# Patient Record
Sex: FEMALE | Race: BLACK OR AFRICAN AMERICAN | Employment: OTHER | URBAN - METROPOLITAN AREA
[De-identification: names, ages, dates, MRNs, and addresses within clinical notes are randomized per-mention and may not be internally consistent; named-entity substitution may affect disease eponyms.]

---

## 2017-04-11 ENCOUNTER — OP HISTORICAL/CONVERTED ENCOUNTER (OUTPATIENT)
Dept: OTHER | Age: 67
End: 2017-04-11

## 2017-05-02 ENCOUNTER — OP HISTORICAL/CONVERTED ENCOUNTER (OUTPATIENT)
Dept: OTHER | Age: 67
End: 2017-05-02

## 2017-07-06 ENCOUNTER — OP HISTORICAL/CONVERTED ENCOUNTER (OUTPATIENT)
Dept: OTHER | Age: 67
End: 2017-07-06

## 2018-01-03 ENCOUNTER — OP HISTORICAL/CONVERTED ENCOUNTER (OUTPATIENT)
Dept: OTHER | Age: 68
End: 2018-01-03

## 2018-01-31 ENCOUNTER — OP HISTORICAL/CONVERTED ENCOUNTER (OUTPATIENT)
Dept: OTHER | Age: 68
End: 2018-01-31

## 2018-04-24 ENCOUNTER — OP HISTORICAL/CONVERTED ENCOUNTER (OUTPATIENT)
Dept: OTHER | Age: 68
End: 2018-04-24

## 2018-11-27 ENCOUNTER — OP HISTORICAL/CONVERTED ENCOUNTER (OUTPATIENT)
Dept: OTHER | Age: 68
End: 2018-11-27

## 2018-12-27 ENCOUNTER — OP HISTORICAL/CONVERTED ENCOUNTER (OUTPATIENT)
Dept: OTHER | Age: 68
End: 2018-12-27

## 2019-05-06 ENCOUNTER — ED HISTORICAL/CONVERTED ENCOUNTER (OUTPATIENT)
Dept: OTHER | Age: 69
End: 2019-05-06

## 2019-05-29 ENCOUNTER — OP HISTORICAL/CONVERTED ENCOUNTER (OUTPATIENT)
Dept: OTHER | Age: 69
End: 2019-05-29

## 2020-08-09 RX ORDER — HYDRALAZINE HYDROCHLORIDE 50 MG/1
TABLET, FILM COATED ORAL
Qty: 270 TAB | Refills: 1 | Status: SHIPPED | OUTPATIENT
Start: 2020-08-09 | End: 2022-10-25 | Stop reason: SDUPTHER

## 2020-08-20 RX ORDER — CLONIDINE 0.2 MG/24H
PATCH, EXTENDED RELEASE TRANSDERMAL
Qty: 12 PATCH | Refills: 1 | Status: SHIPPED | OUTPATIENT
Start: 2020-08-20 | End: 2021-02-05

## 2020-09-01 RX ORDER — DULOXETINE 40 MG/1
CAPSULE, DELAYED RELEASE ORAL
Qty: 90 CAP | Refills: 1 | Status: SHIPPED | OUTPATIENT
Start: 2020-09-01

## 2020-09-23 PROBLEM — J45.909 ASTHMATIC BRONCHITIS: Status: ACTIVE | Noted: 2020-09-23

## 2020-09-23 PROBLEM — G47.00 INSOMNIA: Status: ACTIVE | Noted: 2020-09-23

## 2020-09-23 PROBLEM — H81.10 BENIGN PAROXYSMAL POSITIONAL VERTIGO: Status: ACTIVE | Noted: 2020-09-23

## 2020-09-23 PROBLEM — L25.9 CONTACT DERMATITIS: Status: ACTIVE | Noted: 2020-09-23

## 2020-09-23 PROBLEM — K58.9 IRRITABLE BOWEL SYNDROME: Status: ACTIVE | Noted: 2020-09-23

## 2020-09-23 PROBLEM — I10 ESSENTIAL HYPERTENSION: Status: ACTIVE | Noted: 2020-09-23

## 2020-09-23 PROBLEM — R79.89 AZOTEMIA: Status: ACTIVE | Noted: 2020-09-23

## 2020-09-23 PROBLEM — M79.7 FIBROMYOSITIS: Status: ACTIVE | Noted: 2020-09-23

## 2020-09-23 PROBLEM — M54.50 LOW BACK PAIN: Status: ACTIVE | Noted: 2020-09-23

## 2020-09-23 PROBLEM — F17.200 TOBACCO DEPENDENCE SYNDROME: Status: ACTIVE | Noted: 2020-09-23

## 2020-09-23 PROBLEM — F32.9 REACTIVE DEPRESSION: Status: ACTIVE | Noted: 2020-09-23

## 2020-09-23 PROBLEM — J06.9 URI (UPPER RESPIRATORY INFECTION): Status: ACTIVE | Noted: 2020-09-23

## 2020-09-23 PROBLEM — G60.9 IDIOPATHIC PERIPHERAL NEUROPATHY: Status: ACTIVE | Noted: 2020-09-23

## 2020-09-23 PROBLEM — E78.5 HYPERLIPIDEMIA: Status: ACTIVE | Noted: 2020-09-23

## 2020-09-23 PROBLEM — I71.40 ABDOMINAL AORTIC ANEURYSM (AAA): Status: ACTIVE | Noted: 2020-09-23

## 2020-09-23 PROBLEM — M79.7 FIBROMYALGIA: Status: ACTIVE | Noted: 2020-09-23

## 2020-09-23 PROBLEM — M15.9 DEGENERATIVE JOINT DISEASE INVOLVING MULTIPLE JOINTS: Status: ACTIVE | Noted: 2020-09-23

## 2020-09-23 PROBLEM — R60.9 EDEMA: Status: ACTIVE | Noted: 2020-09-23

## 2020-09-24 ENCOUNTER — OFFICE VISIT (OUTPATIENT)
Dept: INTERNAL MEDICINE CLINIC | Age: 70
End: 2020-09-24
Payer: MEDICARE

## 2020-09-24 VITALS
BODY MASS INDEX: 33.2 KG/M2 | DIASTOLIC BLOOD PRESSURE: 72 MMHG | WEIGHT: 180.4 LBS | TEMPERATURE: 97.8 F | HEIGHT: 62 IN | SYSTOLIC BLOOD PRESSURE: 136 MMHG | RESPIRATION RATE: 18 BRPM

## 2020-09-24 DIAGNOSIS — I10 ESSENTIAL HYPERTENSION: ICD-10-CM

## 2020-09-24 DIAGNOSIS — R60.0 LEG EDEMA, LEFT: ICD-10-CM

## 2020-09-24 DIAGNOSIS — M79.7 FIBROMYOSITIS: ICD-10-CM

## 2020-09-24 DIAGNOSIS — R60.0 EDEMA OF RIGHT LOWER LEG: ICD-10-CM

## 2020-09-24 DIAGNOSIS — J06.9 UPPER RESPIRATORY TRACT INFECTION, UNSPECIFIED TYPE: ICD-10-CM

## 2020-09-24 DIAGNOSIS — I71.40 ABDOMINAL AORTIC ANEURYSM (AAA) WITHOUT RUPTURE: Primary | ICD-10-CM

## 2020-09-24 PROCEDURE — G8417 CALC BMI ABV UP PARAM F/U: HCPCS | Performed by: INTERNAL MEDICINE

## 2020-09-24 PROCEDURE — G8400 PT W/DXA NO RESULTS DOC: HCPCS | Performed by: INTERNAL MEDICINE

## 2020-09-24 PROCEDURE — 1090F PRES/ABSN URINE INCON ASSESS: CPT | Performed by: INTERNAL MEDICINE

## 2020-09-24 PROCEDURE — G8427 DOCREV CUR MEDS BY ELIG CLIN: HCPCS | Performed by: INTERNAL MEDICINE

## 2020-09-24 PROCEDURE — G8536 NO DOC ELDER MAL SCRN: HCPCS | Performed by: INTERNAL MEDICINE

## 2020-09-24 PROCEDURE — 99214 OFFICE O/P EST MOD 30 MIN: CPT | Performed by: INTERNAL MEDICINE

## 2020-09-24 PROCEDURE — 3017F COLORECTAL CA SCREEN DOC REV: CPT | Performed by: INTERNAL MEDICINE

## 2020-09-24 PROCEDURE — G9717 DOC PT DX DEP/BP F/U NT REQ: HCPCS | Performed by: INTERNAL MEDICINE

## 2020-09-24 PROCEDURE — 1101F PT FALLS ASSESS-DOCD LE1/YR: CPT | Performed by: INTERNAL MEDICINE

## 2020-09-24 RX ORDER — AZITHROMYCIN 250 MG/1
250 TABLET, FILM COATED ORAL SEE ADMIN INSTRUCTIONS
Qty: 6 TAB | Refills: 0 | Status: SHIPPED | OUTPATIENT
Start: 2020-09-24 | End: 2020-09-29

## 2020-09-24 RX ORDER — ALBUTEROL SULFATE 90 UG/1
1 AEROSOL, METERED RESPIRATORY (INHALATION)
Qty: 1 INHALER | Refills: 4 | Status: SHIPPED | OUTPATIENT
Start: 2020-09-24 | End: 2020-10-24

## 2020-09-24 RX ORDER — DEXTROMETHORPHAN HYDROBROMIDE, GUAIFENESIN 20; 400 MG/20ML; MG/20ML
5 SOLUTION ORAL
Qty: 120 ML | Refills: 0 | Status: SHIPPED | OUTPATIENT
Start: 2020-09-24

## 2020-09-24 NOTE — PROGRESS NOTES
Rohit Lawson is a 77 y.o. female and presents with Follow Up Chronic Condition and Hypertension  Patient presents accompanied by  she is followed by Dr. Cholo Cooley vascular surgeon for aortic aneurysm of the abdomen. She has no change in symptoms of back pain from her spinal stenosis and arthritis and fibromyalgia. Aneurysm is approaching size 5 cm in diameter and she will have another ultrasound next month and follow-up with Dr. Cholo Cooley after that I advised the patient that I feel she needs close creatinine now and surgical intervention and she will follow-up. She is on carvedilol which might help she will pressure reduction in blood pressure along with clonidine patch 0.2 mg a week furosemide 20 mg a day cardia.   Blood pressure monitored by Dr. Chey Tobin nephrologist for her mild renal impairment hydralazine was lowered to 50 mg 1/2 tablet twice a day by Dr. Chey Tobin she will follow-up with the nephrologist she obtains laboratory studies through the nephrologist and the rheumatologist she continues on nifedipine 60 mg a day she needed a refill of pro-air for her asthmatic COPD and cough which may be more related to postnasal drip from seasonal allergies or recent upper infection she says she wanted a refill of the Z-Alberto I feel the cough is postnasal drip she was fatigued from the gabapentin 100 mg twice a day by the rheumatologist and does not sleep well at night I advised her to switch until the time she can see her rheumatologist to follow-up the gabapentin to 200 mg at night instead of 100 mg twice a day she wants to receive the seasonal flu vaccine here when we get in 2 weeks she will try to lose weight diet exercise she ambulates with a cane she takes Tylenol 3 for pain by Dr. Tameka Freire rheumatologist we reviewed last labs sed rate 59 June of this year TSH 2.5 cholesterol high at 285 but will try to avoid statin drugs due to her chronic muscle joint pain she will try to lose weight take low-cholesterol diet creatinine 1.37 bilirubin 0.2 glucose 75 she was advised to discontinue duloxetine 40 mg a day as she says it does not help the pain she is trying to cut down on meds that do not seem to help and she will discuss this with  could talk us as well she is agreed with for noninvasive venous studies of the legs to further evaluate her chronic edema although may be more related to her medication she has negative Homans sign but history of elevated d-dimer           Review of Systems  Constitutional: negative for fevers, chills, anorexia, weight loss and fatigue,no insomnia  Eyes:   negative for visual disturbance and irritation, eye discharge, eye pain. no eye redness. ENT:   negative for tinnitus, sore throat, nasal congestion, ear pain, hoarseness, hearing loss.,no snoring. Respiratory:  negative for cough, hemoptysis, shortness of breath, wheezing,  CV:   negative for chest pain, palpitations, lower extremity edema, shortness of breath while sitting, walking or at night  GI:   negative for nausea, vomiting, diarrhea, abdominal pain,melena,constipation. Endo:               negative for polyuria, polydipsia, polyphagia, cold or heat intolerance,hair loss. Genitourinary: negative for frequency, dysuria and hematuria,urethral discharge,nocturia.straining while urination,urinary incontinence. Integument:  negative for rash and pruritus  Hematologic:  negative for easy bruising and gum/nose bleeding, enlarged nodes  Musculoskel: negative for myalgias, noted arthralgias, back pain, muscle weakness,noted  joint pain, h/o fall,cramps,calf pain. Neurological:  negative for headaches, dizziness, vertigo, memory problems, gait and seizures loss of consciousness,no ataxia.   Behavl/Psych: negative for feelings of anxiety, depression, mood changes ,sadness    Past Medical History:   Diagnosis Date    Abdominal aortic aneurysm (AAA) ([facility] [address] 9/23/2020    Asthmatic bronchitis 9/23/2020    Azotemia 9/23/2020    Benign paroxysmal positional vertigo 9/23/2020    Contact dermatitis 9/23/2020    Degenerative joint disease involving multiple joints 9/23/2020    Edema 9/23/2020    Essential hypertension 9/23/2020    Fibromyalgia 9/23/2020    Fibromyositis 9/23/2020    Hyperlipidemia 9/23/2020    Idiopathic peripheral neuropathy 9/23/2020    Insomnia 9/23/2020    Irritable bowel syndrome 9/23/2020    Low back pain 9/23/2020    Reactive depression 9/23/2020    Tobacco dependence syndrome 9/23/2020    URI (upper respiratory infection) 9/23/2020     Past Surgical History:   Procedure Laterality Date    HX CHOLECYSTECTOMY  1996    HX HYSTERECTOMY  1995    HX KNEE REPLACEMENT  1980    HX ORTHOPAEDIC Bilateral 2000    carpal tunnel release    HX ORTHOPAEDIC  1996    Right thumb surgery    HX PARATHYROIDECTOMY  1985     Social History     Socioeconomic History    Marital status:      Spouse name: Not on file    Number of children: Not on file    Years of education: Not on file    Highest education level: Not on file   Tobacco Use    Smoking status: Current Every Day Smoker     Packs/day: 0.50     Years: 18.00     Pack years: 9.00     Types: Cigarettes    Smokeless tobacco: Never Used     Family History   Problem Relation Age of Onset    Hypertension Mother     Stroke Mother     Heart Disease Mother     Hypertension Father     Stroke Father      Current Outpatient Medications   Medication Sig Dispense Refill    azithromycin (ZITHROMAX) 250 mg tablet Take 1 Tab by mouth See Admin Instructions for 5 days. 6 Tab 0    dextromethorphan-guaiFENesin (Robitussin Cough-Chest Sunday DM) 5-100 mg/5 mL liqd Take 5 mL by mouth four (4) times daily as needed for Cough or Congestion. 120 mL 0    albuterol (ProAir HFA) 90 mcg/actuation inhaler Take 1 Puff by inhalation every four (4) hours as needed for Wheezing or Shortness of Breath for up to 30 days.  1 Inhaler 4    DULoxetine 40 mg cpDR take 1 capsule by mouth once daily 90 Cap 1    cloNIDine (CATAPRES) 0.2 mg/24 hr ptwk apply 1 patch every week as directed 12 Patch 1    hydrALAZINE (APRESOLINE) 50 mg tablet take 1 tablet by mouth three times a day 270 Tab 1     Allergies   Allergen Reactions    Dexamethasone Other (comments)     Edema         Objective:  Visit Vitals  /72 (BP 1 Location: Right arm, BP Patient Position: Sitting)   Temp 97.8 °F (36.6 °C) (Oral)   Resp 18   Ht 5' 2\" (1.575 m)   Wt 180 lb 6.4 oz (81.8 kg)   BMI 33.00 kg/m²       Physical Exam:   Constitutional: General Appearance: healthy-appearing and obese. Level of Distress: NAD. Ambulation: ambulating with cane   Psychiatric: Mental Status: normal mood and affect and active and alert. Orientation: to time, place, and person. no agitation. ,normal eye contact. normal insight  Head: Head: normocephalic and atraumatic. Eyes: Pupils: PERRLA. Sclerae: non-icteric. ENMT: No lesions on external ear, no hearing loss. No lesions on external nose, sinus tenderness, or nasal discharge. Lips, Teeth, and no mouth or lip ulcers   Neck: Neck: supple, trachea midline, and no masses. Lymph Nodes: no cervical LAD. Thyroid: no enlargement or nodules and non-tender. Lungs: Respiratory effort: no dyspnea. Auscultation: no wheezing, rales/crackles, occasional rhonchi rhonchi and breath sounds normal and good air movement. Cardiovascular: Apical Impulse: not displaced. Heart Auscultation: normal S1 and S2; no murmurs, rubs, or gallops; and RRR. Neck vessels: no carotid bruits. Pulses including femoral / pedal: normal throughout. Abdomen: Bowel Sounds: normal. Inspection and Palpation: no tenderness, guarding, or masses and soft and non-distended. Liver: non-tender and no hepatomegaly. Spleen: non-tender and no splenomegaly  Musculoskeletal[de-identified] Extremities: no edema or varicosities. Calf tenderness. djd  Neurologic: Gait and Station: normal gait and station. Motor Strength normal right and left.  Sensory and cerebellar intact. Skin: Inspection and palpation: no rash, lesions, or ulcer. No results found for this or any previous visit. Assessment/Plan:    ICD-10-CM ICD-9-CM    1. Abdominal aortic aneurysm (AAA) without rupture (HCC)  I71.4 441.4    2. Essential hypertension  I10 401.9    3. Fibromyositis  M79.7 729.1    4. Leg edema, left  R60.0 782.3 US EXT NONVAS RT LTD      US EXT NONVAS LT LTD   5. Edema of right lower leg  R60.0 782.3 US EXT NONVAS RT LTD   6. Upper respiratory tract infection, unspecified type  J06.9 465.9      Orders Placed This Encounter    US EXT NONVAS RT LTD     Standing Status:   Future     Standing Expiration Date:   10/24/2021     Order Specific Question:   Specific Body Part     Answer:   leg     Order Specific Question:   Reason for Exam     Answer:   edema    US EXT NONVAS RT LTD     Standing Status:   Future     Standing Expiration Date:   10/24/2021     Order Specific Question:   Specific Body Part     Answer:   leg    US EXT NONVAS LT LTD     Standing Status:   Future     Standing Expiration Date:   10/24/2021     Order Specific Question:   Specific Body Part     Answer:   leg    azithromycin (ZITHROMAX) 250 mg tablet     Sig: Take 1 Tab by mouth See Admin Instructions for 5 days. Dispense:  6 Tab     Refill:  0    dextromethorphan-guaiFENesin (Robitussin Cough-Chest Sunday DM) 5-100 mg/5 mL liqd     Sig: Take 5 mL by mouth four (4) times daily as needed for Cough or Congestion. Dispense:  120 mL     Refill:  0    albuterol (ProAir HFA) 90 mcg/actuation inhaler     Sig: Take 1 Puff by inhalation every four (4) hours as needed for Wheezing or Shortness of Breath for up to 30 days. Dispense:  1 Inhaler     Refill:  4       lose weight, follow low fat diet, continue present plan, call if any problems    There are no Patient Instructions on file for this visit. Follow-up and Dispositions    · Return in about 6 months (around 3/24/2021).

## 2020-10-05 ENCOUNTER — OFFICE VISIT (OUTPATIENT)
Dept: INTERNAL MEDICINE CLINIC | Age: 70
End: 2020-10-05
Payer: MEDICARE

## 2020-10-05 DIAGNOSIS — Z23 NEEDS FLU SHOT: Primary | ICD-10-CM

## 2020-10-05 DIAGNOSIS — R60.0 BILATERAL LEG EDEMA: Primary | ICD-10-CM

## 2020-10-05 PROCEDURE — 90756 CCIIV4 VACC ABX FREE IM: CPT | Performed by: INTERNAL MEDICINE

## 2020-11-02 ENCOUNTER — HOSPITAL ENCOUNTER (OUTPATIENT)
Dept: NON INVASIVE DIAGNOSTICS | Age: 70
Discharge: HOME | End: 2020-11-02
Attending: INTERNAL MEDICINE
Payer: MEDICARE

## 2020-11-02 DIAGNOSIS — R60.0 BILATERAL LEG EDEMA: ICD-10-CM

## 2020-11-02 PROCEDURE — 93970 EXTREMITY STUDY: CPT

## 2021-02-05 RX ORDER — CLONIDINE 0.2 MG/24H
PATCH, EXTENDED RELEASE TRANSDERMAL
Qty: 12 PATCH | Refills: 1 | Status: SHIPPED | OUTPATIENT
Start: 2021-02-05 | End: 2021-08-27

## 2021-02-16 ENCOUNTER — IMMUNIZATION (OUTPATIENT)
Dept: FAMILY MEDICINE CLINIC | Age: 71
End: 2021-02-16
Payer: MEDICARE

## 2021-02-16 DIAGNOSIS — Z23 ENCOUNTER FOR IMMUNIZATION: Primary | ICD-10-CM

## 2021-02-16 PROCEDURE — 91301 COVID-19, MRNA, LNP-S, PF, 100MCG/0.5ML DOSE(MODERNA): CPT | Performed by: FAMILY MEDICINE

## 2021-02-16 PROCEDURE — 0011A COVID-19, MRNA, LNP-S, PF, 100MCG/0.5ML DOSE(MODERNA): CPT | Performed by: FAMILY MEDICINE

## 2021-03-16 ENCOUNTER — IMMUNIZATION (OUTPATIENT)
Dept: FAMILY MEDICINE CLINIC | Age: 71
End: 2021-03-16
Payer: MEDICARE

## 2021-03-16 DIAGNOSIS — Z23 ENCOUNTER FOR IMMUNIZATION: Primary | ICD-10-CM

## 2021-03-16 PROCEDURE — 0012A COVID-19, MRNA, LNP-S, PF, 100MCG/0.5ML DOSE(MODERNA): CPT | Performed by: FAMILY MEDICINE

## 2021-03-16 PROCEDURE — 91301 COVID-19, MRNA, LNP-S, PF, 100MCG/0.5ML DOSE(MODERNA): CPT | Performed by: FAMILY MEDICINE

## 2021-03-25 ENCOUNTER — OFFICE VISIT (OUTPATIENT)
Dept: INTERNAL MEDICINE CLINIC | Age: 71
End: 2021-03-25
Payer: MEDICARE

## 2021-03-25 VITALS
BODY MASS INDEX: 27.9 KG/M2 | DIASTOLIC BLOOD PRESSURE: 70 MMHG | SYSTOLIC BLOOD PRESSURE: 110 MMHG | HEART RATE: 68 BPM | TEMPERATURE: 98.1 F | WEIGHT: 151.6 LBS | RESPIRATION RATE: 12 BRPM | OXYGEN SATURATION: 100 % | HEIGHT: 62 IN

## 2021-03-25 DIAGNOSIS — R51.9 CHRONIC NONINTRACTABLE HEADACHE, UNSPECIFIED HEADACHE TYPE: ICD-10-CM

## 2021-03-25 DIAGNOSIS — I71.40 ABDOMINAL AORTIC ANEURYSM (AAA) WITHOUT RUPTURE: Primary | ICD-10-CM

## 2021-03-25 DIAGNOSIS — M79.7 FIBROMYOSITIS: ICD-10-CM

## 2021-03-25 DIAGNOSIS — K58.8 OTHER IRRITABLE BOWEL SYNDROME: ICD-10-CM

## 2021-03-25 DIAGNOSIS — G60.9 IDIOPATHIC PERIPHERAL NEUROPATHY: ICD-10-CM

## 2021-03-25 DIAGNOSIS — E78.00 PURE HYPERCHOLESTEROLEMIA: ICD-10-CM

## 2021-03-25 DIAGNOSIS — G89.29 CHRONIC NONINTRACTABLE HEADACHE, UNSPECIFIED HEADACHE TYPE: ICD-10-CM

## 2021-03-25 DIAGNOSIS — F32.9 REACTIVE DEPRESSION: ICD-10-CM

## 2021-03-25 DIAGNOSIS — F51.01 PRIMARY INSOMNIA: ICD-10-CM

## 2021-03-25 DIAGNOSIS — I10 ESSENTIAL HYPERTENSION: ICD-10-CM

## 2021-03-25 PROCEDURE — G8417 CALC BMI ABV UP PARAM F/U: HCPCS | Performed by: INTERNAL MEDICINE

## 2021-03-25 PROCEDURE — 1101F PT FALLS ASSESS-DOCD LE1/YR: CPT | Performed by: INTERNAL MEDICINE

## 2021-03-25 PROCEDURE — G9717 DOC PT DX DEP/BP F/U NT REQ: HCPCS | Performed by: INTERNAL MEDICINE

## 2021-03-25 PROCEDURE — 3017F COLORECTAL CA SCREEN DOC REV: CPT | Performed by: INTERNAL MEDICINE

## 2021-03-25 PROCEDURE — 99214 OFFICE O/P EST MOD 30 MIN: CPT | Performed by: INTERNAL MEDICINE

## 2021-03-25 PROCEDURE — G8427 DOCREV CUR MEDS BY ELIG CLIN: HCPCS | Performed by: INTERNAL MEDICINE

## 2021-03-25 PROCEDURE — G8752 SYS BP LESS 140: HCPCS | Performed by: INTERNAL MEDICINE

## 2021-03-25 PROCEDURE — G8536 NO DOC ELDER MAL SCRN: HCPCS | Performed by: INTERNAL MEDICINE

## 2021-03-25 PROCEDURE — G8754 DIAS BP LESS 90: HCPCS | Performed by: INTERNAL MEDICINE

## 2021-03-25 PROCEDURE — 1090F PRES/ABSN URINE INCON ASSESS: CPT | Performed by: INTERNAL MEDICINE

## 2021-03-25 PROCEDURE — G8400 PT W/DXA NO RESULTS DOC: HCPCS | Performed by: INTERNAL MEDICINE

## 2021-03-25 RX ORDER — NIFEDIPINE 60 MG/1
1 TABLET, EXTENDED RELEASE ORAL 2 TIMES DAILY
COMMUNITY
Start: 2020-12-29

## 2021-03-25 RX ORDER — TRAZODONE HYDROCHLORIDE 100 MG/1
100 TABLET ORAL
Qty: 90 TAB | Refills: 1 | Status: SHIPPED | OUTPATIENT
Start: 2021-03-25 | End: 2021-09-21

## 2021-03-25 RX ORDER — METHOCARBAMOL 750 MG/1
2 TABLET, FILM COATED ORAL AS NEEDED
COMMUNITY
Start: 2021-03-09

## 2021-03-25 RX ORDER — ACETAMINOPHEN AND CODEINE PHOSPHATE 300; 30 MG/1; MG/1
1 TABLET ORAL AS NEEDED
COMMUNITY
Start: 2021-03-09

## 2021-03-25 RX ORDER — ALBUTEROL SULFATE 90 UG/1
2 AEROSOL, METERED RESPIRATORY (INHALATION)
COMMUNITY
End: 2021-10-25

## 2021-03-25 RX ORDER — LIDOCAINE 50 MG/G
PATCH TOPICAL AS NEEDED
COMMUNITY

## 2021-03-25 RX ORDER — GABAPENTIN 100 MG/1
2 CAPSULE ORAL 3 TIMES DAILY
COMMUNITY
Start: 2020-12-29

## 2021-03-25 RX ORDER — CARVEDILOL 12.5 MG/1
1 TABLET ORAL 2 TIMES DAILY
COMMUNITY
Start: 2021-01-17 | End: 2021-04-20

## 2021-03-25 NOTE — PROGRESS NOTES
Chief Complaint   Patient presents with    Follow Up Chronic Condition    Hypertension     1. Have you been to the ER, urgent care clinic since your last visit? Hospitalized since your last visit? No    2. Have you seen or consulted any other health care providers outside of the [address] [place] [address] since your last visit? Include any pap smears or colon screening.  No    Visit Vitals  /70 (BP 1 Location: Left upper arm, BP Patient Position: Sitting, BP Cuff Size: Adult)   Pulse 68   Temp 98.1 °F (36.7 °C) (Oral)   Resp 12   Ht 5' 2\" (1.575 m)   Wt 151 lb 9.6 oz (68.8 kg)   SpO2 100%   BMI 27.73 kg/m²

## 2021-03-25 NOTE — PROGRESS NOTES
Nathalie Del Valle is a 72 y.o. female and presents with Follow Up Chronic Condition and Hypertension  Patient presents for follow-up he feels well and has lost considerable weight volitionally by watching her diet eating small portions and I congratulated her she has some situational depression during coronavirus pandemic and worrying about her aortic aneurysm and health issues it was decided to give her a low-dose of trazodone to see if it would help with some situational depression and insomnia. She does see Dr. Brennen Magaña and Dr. Kandi Sands is rheumatologist in [place] for fibromyalgia and arthritis and still has chronic pain I advised her that he needs to adjust her medications including the narcotics Tylenol 3. Patient is on Robaxin 2 tablets 4 times a day which makes her awfully tired and she has been taking 2 tablets twice a day advised her to try to cut it down to 1 tablet twice a day to see if it would help until seen by rheumatologist she also sees Dr. Chepe Hannon nephrologist for renal impairment and is on hydralazine she also takes nifedipine 60 mg twice a day carvedilol 12.5 mg twice a day for essential hypertension she has a abdominal aortic aneurysm followed by Dr. Sylvia Palomo vascular surgeon and is soon to see him this coming June for repeat ultrasound aneurysms about 5 cm stable. She is on hydralazine 50 mg half tablet twice a day Lasix 20 mg in the morning but she doesn't take it all the time as she has no peripheral edema advised her to just use it Mondays Wednesdays and Fridays of her legs become edematous.   She has received the coronavirus vaccine we discussed the vaccine for coronavirus pandemic evaluation treatment of disease I also advised her to change the gabapentin to with 200 mg at bedtime instead of 100 mg twice a day as it would make her less sleepy during the day as well until seen by her rheumatologist labs drawn by the office phlebotomist for those studies below-patient said the number of weeks ago she fell landed on her back and hit the back of her head she says she did not lose consciousness no nausea no blurry vision tingling in the sensation in the skin in the occiput she was a bit worried it was decided to obtain noncontrast CT of the brain and this was ordered           Review of Systems  Constitutional: negative for fevers, chills, anorexia, weight loss and fatigue,no insomnia overweight for height  Eyes:   negative for visual disturbance and irritation, eye discharge, eye pain. no eye redness. ENT:   negative for tinnitus, sore throat, nasal congestion, ear pain, hoarseness, hearing loss.,no snoring. Respiratory:  negative for cough, hemoptysis, shortness of breath, wheezing,  CV:   negative for chest pain, palpitations, lower extremity edema, shortness of breath while sitting, walking or at night  GI:   negative for nausea, vomiting, diarrhea, abdominal pain,melena,constipation. Symptoms of irritable bowel  Endo:               negative for polyuria, polydipsia, polyphagia, cold or heat intolerance,hair loss. Genitourinary: negative for frequency, dysuria and hematuria,urethral discharge,nocturia.straining while urination,urinary incontinence. Integument:  negative for rash and pruritus  Hematologic:  negative for easy bruising and gum/nose bleeding, enlarged nodes  Musculoskel: negative for myalgias, arthralgias, back pain, muscle weakness, joint pain, h/o fall,cramps,calf pain. DJD-fibromyalgia  Neurological:  Noted occasional for headaches, no dizziness, vertigo, memory problems, gait and seizures loss of consciousness,no ataxia.   Behavl/Psych: negative for feelings of anxiety, depression, mood changes ,sadness    Past Medical History:   Diagnosis Date    [facility]    Asthmatic bronchitis 9/23/2020    Azotemia 9/23/2020    Benign paroxysmal positional vertigo 9/23/2020    Contact dermatitis 9/23/2020    Degenerative joint disease involving multiple joints 9/23/2020    Edema 9/23/2020    Essential hypertension 9/23/2020    Fibromyalgia 9/23/2020    Fibromyositis 9/23/2020    Hyperlipidemia 9/23/2020    Idiopathic peripheral neuropathy 9/23/2020    Insomnia 9/23/2020    Irritable bowel syndrome 9/23/2020    Low back pain 9/23/2020    Reactive depression 9/23/2020    Tobacco dependence syndrome 9/23/2020    URI (upper respiratory infection) 9/23/2020     Past Surgical History:   Procedure Laterality Date    HX CHOLECYSTECTOMY  1996    HX HYSTERECTOMY  1995    HX KNEE REPLACEMENT  1980    HX ORTHOPAEDIC Bilateral 2000    carpal tunnel release    HX ORTHOPAEDIC  1996    Right thumb surgery    HX PARATHYROIDECTOMY  1985     Social History     Socioeconomic History    Marital status:      Spouse name: Not on file    Number of children: Not on file    Years of education: Not on file    Highest education level: Not on file   Tobacco Use    Smoking status: Current Every Day Smoker     Packs/day: 0.50     Years: 18.00     Pack years: 9.00     Types: Cigarettes    Smokeless tobacco: Never Used     Family History   Problem Relation Age of Onset    Hypertension Mother     Stroke Mother     Heart Disease Mother     Hypertension Father     Stroke Father      Current Outpatient Medications   Medication Sig Dispense Refill    acetaminophen-codeine (TYLENOL #3) 300-30 mg per tablet Take 1 Tab by mouth as needed.  albuterol (ProAir HFA) 90 mcg/actuation inhaler Take 2 Puffs by inhalation four (4) times daily as needed.  carvediloL (COREG) 12.5 mg tablet Take 1 Tab by mouth two (2) times a day.  lidocaine (LIDODERM) 5 % as needed.  methocarbamoL (ROBAXIN) 750 mg tablet Take 2 Tabs by mouth as needed.  NIFEdipine ER (ADALAT CC) 60 mg ER tablet Take 1 Tab by mouth two (2) times a day.  traZODone (DESYREL) 100 mg tablet Take 1 Tab by mouth nightly for 180 days.  90 Tab 1    cloNIDine (CATAPRES) 0.2 mg/24 hr ptwk apply 1 patch every week as directed 12 Patch 1    hydrALAZINE (APRESOLINE) 50 mg tablet take 1 tablet by mouth three times a day 270 Tab 1    gabapentin (NEURONTIN) 100 mg capsule Take 2 Caps by mouth three (3) times daily.  dextromethorphan-guaiFENesin (Robitussin Cough-Chest Sunday DM) 5-100 mg/5 mL liqd Take 5 mL by mouth four (4) times daily as needed for Cough or Congestion. 120 mL 0    DULoxetine 40 mg cpDR take 1 capsule by mouth once daily 90 Cap 1     Allergies   Allergen Reactions    Dexamethasone Other (comments)     Edema         Objective:  Visit Vitals  /70 (BP 1 Location: Left upper arm, BP Patient Position: Sitting, BP Cuff Size: Adult)   Pulse 68   Temp 98.1 °F (36.7 °C) (Oral)   Resp 12   Ht 5' 2\" (1.575 m)   Wt 151 lb 9.6 oz (68.8 kg)   SpO2 100%   BMI 27.73 kg/m²       Physical Exam:   Constitutional: General Appearance: healthy-appearing and obese. Level of Distress: NAD. Ambulation: ambulating with a cane DJD changes. Wearing wig/therapies  Psychiatric: Mental Status: normal mood and affect and active and alert. Orientation: to time, place, and person. no agitation. ,normal eye contact. normal insight  Head: Head: normocephalic and atraumatic. Eyes: Pupils: PERRLA. Sclerae: non-icteric. ENMT: No lesions on external ear, no hearing loss. No lesions on external nose, sinus tenderness, or nasal discharge. Lips, Teeth, and no mouth or lip ulcers   Neck: Neck: supple, trachea midline, and no masses. Lymph Nodes: no cervical LAD. Thyroid: no enlargement or nodules and non-tender. Lungs: Respiratory effort: no dyspnea. Auscultation: no wheezing, rales/crackles, or rhonchi and breath sounds normal and good air movement. Cardiovascular: Apical Impulse: not displaced. Heart Auscultation: normal S1 and S2; no murmurs, rubs, or gallops; and RRR. Neck vessels: no carotid bruits. Pulses including femoral / pedal: normal throughout. Abdomen:  Bowel Sounds: normal. Inspection and Palpation: no tenderness, guarding, or masses and soft and non-distended. Liver: non-tender   Musculoskeletal[de-identified] Extremities: no edema or varicosities. Calf tenderness. Neurologic: Gait and Station: normal gait and station. Motor Strength normal right and left. Sensory and cerebellar intact. Ambulating with a cane  Skin: Inspection and palpation: no rash, lesions, or ulcer. Results for orders placed or performed in visit on 03/25/21   CBC WITH AUTOMATED DIFF   Result Value Ref Range    WBC 7.0 3.4 - 10.8 x10E3/uL    RBC 5.09 3.77 - 5.28 x10E6/uL    HGB 13.8 11.1 - 15.9 g/dL    HCT 42.1 34.0 - 46.6 %    MCV 83 79 - 97 fL    MCH 27.1 26.6 - 33.0 pg    MCHC 32.8 31.5 - 35.7 g/dL    RDW 16.0 (H) 11.7 - 15.4 %    PLATELET 707 443 - 530 x10E3/uL    NEUTROPHILS 57 Not Estab. %    Lymphocytes 33 Not Estab. %    MONOCYTES 6 Not Estab. %    EOSINOPHILS 4 Not Estab. %    BASOPHILS 0 Not Estab. %    ABS. NEUTROPHILS 4.0 1.4 - 7.0 x10E3/uL    Abs Lymphocytes 2.3 0.7 - 3.1 x10E3/uL    ABS. MONOCYTES 0.4 0.1 - 0.9 x10E3/uL    ABS. EOSINOPHILS 0.3 0.0 - 0.4 x10E3/uL    ABS. BASOPHILS 0.0 0.0 - 0.2 x10E3/uL    IMMATURE GRANULOCYTES 0 Not Estab. %    ABS. IMM.  GRANS. 0.0 0.0 - 0.1 x10E3/uL   TSH 3RD GENERATION   Result Value Ref Range    TSH 1.500 0.450 - 4.500 uIU/mL   T4, FREE   Result Value Ref Range    T4, Free 1.33 0.82 - 1.77 ng/dL   LIPID PANEL   Result Value Ref Range    Cholesterol, total 301 (H) 100 - 199 mg/dL    Triglyceride 170 (H) 0 - 149 mg/dL    HDL Cholesterol 48 >39 mg/dL    VLDL, calculated 33 5 - 40 mg/dL    LDL, calculated 220 (H) 0 - 99 mg/dL   METABOLIC PANEL, COMPREHENSIVE   Result Value Ref Range    Glucose 94 65 - 99 mg/dL    BUN 9 8 - 27 mg/dL    Creatinine 1.20 (H) 0.57 - 1.00 mg/dL    GFR est non-AA 46 (L) >59 mL/min/1.73    GFR est AA 53 (L) >59 mL/min/1.73    BUN/Creatinine ratio 8 (L) 12 - 28    Sodium 144 134 - 144 mmol/L    Potassium 3.6 3.5 - 5.2 mmol/L    Chloride 109 (H) 96 - 106 mmol/L CO2 22 20 - 29 mmol/L    Calcium 10.8 (H) 8.7 - 10.3 mg/dL    Protein, total 7.0 6.0 - 8.5 g/dL    Albumin 4.5 3.8 - 4.8 g/dL    GLOBULIN, TOTAL 2.5 1.5 - 4.5 g/dL    A-G Ratio 1.8 1.2 - 2.2    Bilirubin, total <0.2 0.0 - 1.2 mg/dL    Alk. phosphatase 123 (H) 39 - 117 IU/L    AST (SGOT) 9 0 - 40 IU/L    ALT (SGPT) 7 0 - 32 IU/L       Assessment/Plan:    ICD-10-CM ICD-9-CM    1. Abdominal aortic aneurysm (AAA) without rupture (HCC)  I71.4 441.4    2. Essential hypertension  I10 401.9 CBC WITH AUTOMATED DIFF      TSH 3RD GENERATION      T4, FREE      LIPID PANEL      METABOLIC PANEL, COMPREHENSIVE   3. Other irritable bowel syndrome  K58.8 564.1    4. Fibromyositis  M79.7 729.1 TSH 3RD GENERATION      T4, FREE   5. Idiopathic peripheral neuropathy  G60.9 356.9    6. Primary insomnia  F51.01 307.42    7. Pure hypercholesterolemia  E78.00 272.0    8. Chronic nonintractable headache, unspecified headache type  R51.9 784.0 CT HEAD WO CONT    G89.29     9. Reactive depression  F32.9 300.4      Orders Placed This Encounter    CT HEAD WO CONT     Standing Status:   Future     Standing Expiration Date:   4/25/2022    CBC WITH AUTOMATED DIFF    TSH 3RD GENERATION    T4, FREE    LIPID PANEL    METABOLIC PANEL, COMPREHENSIVE    acetaminophen-codeine (TYLENOL #3) 300-30 mg per tablet     Sig: Take 1 Tab by mouth as needed.  albuterol (ProAir HFA) 90 mcg/actuation inhaler     Sig: Take 2 Puffs by inhalation four (4) times daily as needed.  carvediloL (COREG) 12.5 mg tablet     Sig: Take 1 Tab by mouth two (2) times a day.  gabapentin (NEURONTIN) 100 mg capsule     Sig: Take 2 Caps by mouth three (3) times daily.  lidocaine (LIDODERM) 5 %     Sig: as needed.  methocarbamoL (ROBAXIN) 750 mg tablet     Sig: Take 2 Tabs by mouth as needed.  NIFEdipine ER (ADALAT CC) 60 mg ER tablet     Sig: Take 1 Tab by mouth two (2) times a day.     traZODone (DESYREL) 100 mg tablet     Sig: Take 1 Tab by mouth nightly for 180 days. Dispense:  90 Tab     Refill:  1       routine labs ordered, call if any problems    There are no Patient Instructions on file for this visit. Follow-up and Dispositions    · Return in about 6 months (around 9/25/2021).

## 2021-03-26 LAB
ALBUMIN SERPL-MCNC: 4.5 G/DL (ref 3.8–4.8)
ALBUMIN/GLOB SERPL: 1.8 {RATIO} (ref 1.2–2.2)
ALP SERPL-CCNC: 123 IU/L (ref 39–117)
ALT SERPL-CCNC: 7 IU/L (ref 0–32)
AST SERPL-CCNC: 9 IU/L (ref 0–40)
BASOPHILS # BLD AUTO: 0 X10E3/UL (ref 0–0.2)
BASOPHILS NFR BLD AUTO: 0 %
BILIRUB SERPL-MCNC: <0.2 MG/DL (ref 0–1.2)
BUN SERPL-MCNC: 9 MG/DL (ref 8–27)
BUN/CREAT SERPL: 8 (ref 12–28)
CALCIUM SERPL-MCNC: 10.8 MG/DL (ref 8.7–10.3)
CHLORIDE SERPL-SCNC: 109 MMOL/L (ref 96–106)
CHOLEST SERPL-MCNC: 301 MG/DL (ref 100–199)
CO2 SERPL-SCNC: 22 MMOL/L (ref 20–29)
CREAT SERPL-MCNC: 1.2 MG/DL (ref 0.57–1)
EOSINOPHIL # BLD AUTO: 0.3 X10E3/UL (ref 0–0.4)
EOSINOPHIL NFR BLD AUTO: 4 %
ERYTHROCYTE [DISTWIDTH] IN BLOOD BY AUTOMATED COUNT: 16 % (ref 11.7–15.4)
GLOBULIN SER CALC-MCNC: 2.5 G/DL (ref 1.5–4.5)
GLUCOSE SERPL-MCNC: 94 MG/DL (ref 65–99)
HCT VFR BLD AUTO: 42.1 % (ref 34–46.6)
HDLC SERPL-MCNC: 48 MG/DL
HGB BLD-MCNC: 13.8 G/DL (ref 11.1–15.9)
IMM GRANULOCYTES # BLD AUTO: 0 X10E3/UL (ref 0–0.1)
IMM GRANULOCYTES NFR BLD AUTO: 0 %
LDLC SERPL CALC-MCNC: 220 MG/DL (ref 0–99)
LYMPHOCYTES # BLD AUTO: 2.3 X10E3/UL (ref 0.7–3.1)
LYMPHOCYTES NFR BLD AUTO: 33 %
MCH RBC QN AUTO: 27.1 PG (ref 26.6–33)
MCHC RBC AUTO-ENTMCNC: 32.8 G/DL (ref 31.5–35.7)
MCV RBC AUTO: 83 FL (ref 79–97)
MONOCYTES # BLD AUTO: 0.4 X10E3/UL (ref 0.1–0.9)
MONOCYTES NFR BLD AUTO: 6 %
NEUTROPHILS # BLD AUTO: 4 X10E3/UL (ref 1.4–7)
NEUTROPHILS NFR BLD AUTO: 57 %
PLATELET # BLD AUTO: 178 X10E3/UL (ref 150–450)
POTASSIUM SERPL-SCNC: 3.6 MMOL/L (ref 3.5–5.2)
PROT SERPL-MCNC: 7 G/DL (ref 6–8.5)
RBC # BLD AUTO: 5.09 X10E6/UL (ref 3.77–5.28)
SODIUM SERPL-SCNC: 144 MMOL/L (ref 134–144)
T4 FREE SERPL-MCNC: 1.33 NG/DL (ref 0.82–1.77)
TRIGL SERPL-MCNC: 170 MG/DL (ref 0–149)
TSH SERPL DL<=0.005 MIU/L-ACNC: 1.5 UIU/ML (ref 0.45–4.5)
VLDLC SERPL CALC-MCNC: 33 MG/DL (ref 5–40)
WBC # BLD AUTO: 7 X10E3/UL (ref 3.4–10.8)

## 2021-03-26 NOTE — PROGRESS NOTES
Notify the patient all labs good except-cholesterol 300.   We can start low-dose cholesterol medication but it might make her muscles ache a little or she can continue to lose weight watch low-cholesterol low-fat diet but her only come down a little bit more

## 2021-03-29 DIAGNOSIS — E78.00 PURE HYPERCHOLESTEROLEMIA: Primary | ICD-10-CM

## 2021-03-29 RX ORDER — PRAVASTATIN SODIUM 40 MG/1
40 TABLET ORAL
Qty: 90 TAB | Refills: 0 | Status: SHIPPED | OUTPATIENT
Start: 2021-03-29 | End: 2021-06-23

## 2021-03-30 ENCOUNTER — HOSPITAL ENCOUNTER (OUTPATIENT)
Dept: CT IMAGING | Age: 71
Discharge: HOME | End: 2021-03-30
Attending: INTERNAL MEDICINE
Payer: MEDICARE

## 2021-03-30 DIAGNOSIS — R51.9 CHRONIC NONINTRACTABLE HEADACHE, UNSPECIFIED HEADACHE TYPE: ICD-10-CM

## 2021-03-30 DIAGNOSIS — G89.29 CHRONIC NONINTRACTABLE HEADACHE, UNSPECIFIED HEADACHE TYPE: ICD-10-CM

## 2021-03-30 PROCEDURE — 70450 CT HEAD/BRAIN W/O DYE: CPT

## 2021-04-20 RX ORDER — CARVEDILOL 12.5 MG/1
TABLET ORAL
Qty: 180 TAB | Refills: 0 | Status: SHIPPED | OUTPATIENT
Start: 2021-04-20 | End: 2021-07-26

## 2021-06-23 RX ORDER — PRAVASTATIN SODIUM 40 MG/1
TABLET ORAL
Qty: 90 TABLET | Refills: 0 | Status: SHIPPED | OUTPATIENT
Start: 2021-06-23 | End: 2021-09-27

## 2021-07-26 RX ORDER — CARVEDILOL 12.5 MG/1
TABLET ORAL
Qty: 180 TABLET | Refills: 0 | Status: SHIPPED | OUTPATIENT
Start: 2021-07-26 | End: 2021-10-23

## 2021-08-27 RX ORDER — CLONIDINE 0.2 MG/24H
PATCH, EXTENDED RELEASE TRANSDERMAL
Qty: 12 PATCH | Refills: 1 | Status: SHIPPED | OUTPATIENT
Start: 2021-08-27 | End: 2022-02-11 | Stop reason: SDUPTHER

## 2021-09-27 RX ORDER — PRAVASTATIN SODIUM 40 MG/1
TABLET ORAL
Qty: 90 TABLET | Refills: 0 | Status: SHIPPED | OUTPATIENT
Start: 2021-09-27 | End: 2021-12-27

## 2021-10-23 RX ORDER — CARVEDILOL 12.5 MG/1
TABLET ORAL
Qty: 180 TABLET | Refills: 0 | Status: SHIPPED | OUTPATIENT
Start: 2021-10-23 | End: 2022-01-20

## 2021-10-25 RX ORDER — ALBUTEROL SULFATE 90 UG/1
2 AEROSOL, METERED RESPIRATORY (INHALATION)
Qty: 8.5 G | Refills: 2 | Status: SHIPPED | OUTPATIENT
Start: 2021-10-25 | End: 2022-01-23

## 2021-11-23 ENCOUNTER — OFFICE VISIT (OUTPATIENT)
Dept: INTERNAL MEDICINE CLINIC | Age: 71
End: 2021-11-23
Payer: MEDICARE

## 2021-11-23 VITALS
TEMPERATURE: 97.8 F | HEART RATE: 64 BPM | WEIGHT: 135.4 LBS | OXYGEN SATURATION: 98 % | BODY MASS INDEX: 24.92 KG/M2 | RESPIRATION RATE: 16 BRPM | DIASTOLIC BLOOD PRESSURE: 90 MMHG | HEIGHT: 62 IN | SYSTOLIC BLOOD PRESSURE: 148 MMHG

## 2021-11-23 DIAGNOSIS — G60.9 IDIOPATHIC PERIPHERAL NEUROPATHY: ICD-10-CM

## 2021-11-23 DIAGNOSIS — R63.4 WEIGHT LOSS, UNINTENTIONAL: ICD-10-CM

## 2021-11-23 DIAGNOSIS — M79.7 FIBROMYOSITIS: Primary | ICD-10-CM

## 2021-11-23 DIAGNOSIS — I71.40 ABDOMINAL AORTIC ANEURYSM (AAA) WITHOUT RUPTURE: ICD-10-CM

## 2021-11-23 DIAGNOSIS — I10 ESSENTIAL HYPERTENSION: ICD-10-CM

## 2021-11-23 DIAGNOSIS — E78.00 PURE HYPERCHOLESTEROLEMIA: ICD-10-CM

## 2021-11-23 PROCEDURE — G8427 DOCREV CUR MEDS BY ELIG CLIN: HCPCS | Performed by: INTERNAL MEDICINE

## 2021-11-23 PROCEDURE — G8536 NO DOC ELDER MAL SCRN: HCPCS | Performed by: INTERNAL MEDICINE

## 2021-11-23 PROCEDURE — 99214 OFFICE O/P EST MOD 30 MIN: CPT | Performed by: INTERNAL MEDICINE

## 2021-11-23 PROCEDURE — G8753 SYS BP > OR = 140: HCPCS | Performed by: INTERNAL MEDICINE

## 2021-11-23 PROCEDURE — G9717 DOC PT DX DEP/BP F/U NT REQ: HCPCS | Performed by: INTERNAL MEDICINE

## 2021-11-23 PROCEDURE — G8400 PT W/DXA NO RESULTS DOC: HCPCS | Performed by: INTERNAL MEDICINE

## 2021-11-23 PROCEDURE — 3017F COLORECTAL CA SCREEN DOC REV: CPT | Performed by: INTERNAL MEDICINE

## 2021-11-23 PROCEDURE — G8755 DIAS BP > OR = 90: HCPCS | Performed by: INTERNAL MEDICINE

## 2021-11-23 PROCEDURE — 1090F PRES/ABSN URINE INCON ASSESS: CPT | Performed by: INTERNAL MEDICINE

## 2021-11-23 PROCEDURE — G8420 CALC BMI NORM PARAMETERS: HCPCS | Performed by: INTERNAL MEDICINE

## 2021-11-23 PROCEDURE — 1101F PT FALLS ASSESS-DOCD LE1/YR: CPT | Performed by: INTERNAL MEDICINE

## 2021-11-23 NOTE — PROGRESS NOTES
Anthony Rosen is a 72 y.o. female and presents with Follow Up Chronic Condition  Patient presents for follow-up accompanied by  he continues to lose weight for unexplained reasons [facility]algia irritable bowel possibly due to that as well she is has chronic pain but I will leave the treatment under the rheumatologist blood pressure 130/80 right arm she feels the pain may be due to his medications as she read the side effects of all of them she has a history of peripheral neuropathy hypercholesterolemia abdominal aortic aneurysm slowly growing and she is anticipating abdominal aortic aneurysm repair January of this year by Dr. Kellee Scott year vascular surgeon I do not have the recent records she has a family history including mother  from abdominal aneurysm rupture and the patient was worried last labs  this year yielded a glucose of 94 creatinine 1.2 potassium 3.6 sodium 144 AST 9 cholesterol elevated 301 with  patient try low-cholesterol low-fat diet-she was agreeable for the office phlebotomist to draw blood for the studies below we discussed CT imaging of the abdomen and pelvis she wishes to wait till after her surgery. She will try to watch her diet-she is 72years of age           Review of Systems  Constitutional: negative for fevers, chills, anorexia, noted weight loss and fatigue,no insomnia  Eyes:   negative for visual disturbance and irritation, eye discharge, eye pain. no eye redness. ENT:   negative for tinnitus, sore throat, nasal congestion, ear pain, hoarseness, hearing loss.,no snoring. Respiratory:  negative for cough, hemoptysis, shortness of breath, wheezing,  CV:   negative for chest pain, palpitations, lower extremity edema, shortness of breath while sitting, walking or at night  GI:   negative for nausea, vomiting, diarrhea, abdominal pain,melena,constipation.   Endo:               negative for polyuria, polydipsia, polyphagia, cold or heat intolerance,hair loss. Genitourinary: negative for frequency, dysuria and hematuria,urethral discharge,nocturia.straining while urination,urinary incontinence. Integument:  negative for rash and pruritus  Hematologic:  negative for easy bruising and gum/nose bleeding, enlarged nodes  Musculoskel: negative for myalgias, arthralgias, back pain, muscle weakness, joint pain, h/o fall,cramps,calf pain. DJD  Neurological:  negative for headaches, dizziness, vertigo, memory problems, gait and seizures loss of consciousness,no ataxia.   Sensory neuropathy  Behavl/Psych: negative for feelings of anxiety, depression, mood changes ,sadness    Past Medical History:   Diagnosis Date    Abdominal aortic aneurysm (AAA) ([facility] [address] 9/23/2020    Asthmatic bronchitis 9/23/2020    Azotemia 9/23/2020    Benign paroxysmal positional vertigo 9/23/2020    Contact dermatitis 9/23/2020    Degenerative joint disease involving multiple joints 9/23/2020    Edema 9/23/2020    Essential hypertension 9/23/2020    Fibromyalgia 9/23/2020    Fibromyositis 9/23/2020    Hyperlipidemia 9/23/2020    Idiopathic peripheral neuropathy 9/23/2020    Insomnia 9/23/2020    Irritable bowel syndrome 9/23/2020    Low back pain 9/23/2020    Reactive depression 9/23/2020    Tobacco dependence syndrome 9/23/2020    URI (upper respiratory infection) 9/23/2020     Past Surgical History:   Procedure Laterality Date    HX CHOLECYSTECTOMY  1996    HX HYSTERECTOMY  1995    HX KNEE REPLACEMENT  1980    HX ORTHOPAEDIC Bilateral 2000    carpal tunnel release    HX ORTHOPAEDIC  1996    Right thumb surgery    HX PARATHYROIDECTOMY  1985     Social History     Socioeconomic History    Marital status:    Tobacco Use    Smoking status: Current Every Day Smoker     Packs/day: 0.50     Years: 18.00     Pack years: 9.00     Types: Cigarettes    Smokeless tobacco: Never Used   Vaping Use    Vaping Use: Never used     Family History   Problem Relation Age of Onset    Hypertension Mother     Stroke Mother     Heart Disease Mother     Hypertension Father     Stroke Father      Current Outpatient Medications   Medication Sig Dispense Refill    carvediloL (COREG) 12.5 mg tablet take 1 tablet by mouth twice a day 180 Tablet 0    pravastatin (PRAVACHOL) 40 mg tablet take 1 tablet by mouth every NIGHT 90 Tablet 0    cloNIDine (CATAPRES) 0.2 mg/24 hr ptwk apply 1 patch every week as directed 12 Patch 1    acetaminophen-codeine (TYLENOL #3) 300-30 mg per tablet Take 1 Tab by mouth as needed.  gabapentin (NEURONTIN) 100 mg capsule Take 2 Caps by mouth three (3) times daily.  methocarbamoL (ROBAXIN) 750 mg tablet Take 2 Tabs by mouth as needed.  NIFEdipine ER (ADALAT CC) 60 mg ER tablet Take 1 Tab by mouth two (2) times a day.  hydrALAZINE (APRESOLINE) 50 mg tablet take 1 tablet by mouth three times a day 270 Tab 1    albuterol (PROVENTIL HFA, VENTOLIN HFA, PROAIR HFA) 90 mcg/actuation inhaler Take 2 Puffs by inhalation every six (6) hours as needed for Wheezing for up to 90 days. (Patient not taking: Reported on 11/23/2021) 8.5 g 2    lidocaine (LIDODERM) 5 % as needed. (Patient not taking: Reported on 11/23/2021)      dextromethorphan-guaiFENesin (Robitussin Cough-Chest Sunday DM) 5-100 mg/5 mL liqd Take 5 mL by mouth four (4) times daily as needed for Cough or Congestion.  (Patient not taking: Reported on 11/23/2021) 120 mL 0    DULoxetine 40 mg cpDR take 1 capsule by mouth once daily (Patient not taking: Reported on 11/23/2021) 90 Cap 1     Allergies   Allergen Reactions    Dexamethasone Other (comments)     Edema         Objective:  Visit Vitals  BP (!) 148/90 (BP 1 Location: Left upper arm, BP Patient Position: Sitting)   Pulse 64   Temp 97.8 °F (36.6 °C)   Resp 16   Ht 5' 2\" (1.575 m)   Wt 135 lb 6.4 oz (61.4 kg)   SpO2 98%   BMI 24.76 kg/m²       Physical Exam:   Constitutional: General Appearance: healthy-appearing and obese. Level of Distress: NAD. Ambulation: ambulating normally. Wearing leg noted weight loss unintentional  Psychiatric: Mental Status: normal mood and affect and active and alert. Orientation: to time, place, and person. no agitation. ,normal eye contact. normal insight  Head: Head: normocephalic and atraumatic. Eyes: Pupils: PERRLA. Sclerae: non-icteric. ENMT: No lesions on external ear, no hearing loss. No lesions on external nose, sinus tenderness, or nasal discharge. Lips, Teeth, and no mouth or lip ulcers   Neck: Neck: supple, trachea midline, and no masses. Lymph Nodes: no cervical LAD. Thyroid: no enlargement or nodules and non-tender. Lungs: Respiratory effort: no dyspnea. Auscultation: no wheezing, rales/crackles, or rhonchi and breath sounds normal and good air movement. Cardiovascular: Apical Impulse: not displaced. Heart Auscultation: normal S1 and S2; no murmurs, rubs, or gallops; and RRR. Neck vessels: no carotid bruits. Pulses including femoral / pedal: normal throughout. Abdomen: Bowel Sounds: normal. Inspection and Palpation: no tenderness, guarding, or masses and soft and non-distended. Liver: non-tender and no hepatomegaly. Musculoskeletal[de-identified] Extremities: no edema or varicosities. Calf tenderness. DJD  Neurologic: Gait and Station: normal gait and station. Motor Strength normal right and left. Sensory and cerebellar intact. Skin: Inspection and palpation: no rash, lesions, or ulcer.        Results for orders placed or performed in visit on 03/25/21   CBC WITH AUTOMATED DIFF   Result Value Ref Range    WBC 7.0 3.4 - 10.8 x10E3/uL    RBC 5.09 3.77 - 5.28 x10E6/uL    HGB 13.8 11.1 - 15.9 g/dL    HCT 42.1 34.0 - 46.6 %    MCV 83 79 - 97 fL    MCH 27.1 26.6 - 33.0 pg    MCHC 32.8 31.5 - 35.7 g/dL    RDW 16.0 (H) 11.7 - 15.4 %    PLATELET 872 095 - 446 x10E3/uL    NEUTROPHILS 57 Not Estab. %    Lymphocytes 33 Not Estab. %    MONOCYTES 6 Not Estab. %    EOSINOPHILS 4 Not Estab. %    BASOPHILS 0 Not Estab. %    ABS. NEUTROPHILS 4.0 1.4 - 7.0 x10E3/uL    Abs Lymphocytes 2.3 0.7 - 3.1 x10E3/uL    ABS. MONOCYTES 0.4 0.1 - 0.9 x10E3/uL    ABS. EOSINOPHILS 0.3 0.0 - 0.4 x10E3/uL    ABS. BASOPHILS 0.0 0.0 - 0.2 x10E3/uL    IMMATURE GRANULOCYTES 0 Not Estab. %    ABS. IMM. GRANS. 0.0 0.0 - 0.1 x10E3/uL   TSH 3RD GENERATION   Result Value Ref Range    TSH 1.500 0.450 - 4.500 uIU/mL   T4, FREE   Result Value Ref Range    T4, Free 1.33 0.82 - 1.77 ng/dL   LIPID PANEL   Result Value Ref Range    Cholesterol, total 301 (H) 100 - 199 mg/dL    Triglyceride 170 (H) 0 - 149 mg/dL    HDL Cholesterol 48 >39 mg/dL    VLDL, calculated 33 5 - 40 mg/dL    LDL, calculated 220 (H) 0 - 99 mg/dL   METABOLIC PANEL, COMPREHENSIVE   Result Value Ref Range    Glucose 94 65 - 99 mg/dL    BUN 9 8 - 27 mg/dL    Creatinine 1.20 (H) 0.57 - 1.00 mg/dL    GFR est non-AA 46 (L) >59 mL/min/1.73    GFR est AA 53 (L) >59 mL/min/1.73    BUN/Creatinine ratio 8 (L) 12 - 28    Sodium 144 134 - 144 mmol/L    Potassium 3.6 3.5 - 5.2 mmol/L    Chloride 109 (H) 96 - 106 mmol/L    CO2 22 20 - 29 mmol/L    Calcium 10.8 (H) 8.7 - 10.3 mg/dL    Protein, total 7.0 6.0 - 8.5 g/dL    Albumin 4.5 3.8 - 4.8 g/dL    GLOBULIN, TOTAL 2.5 1.5 - 4.5 g/dL    A-G Ratio 1.8 1.2 - 2.2    Bilirubin, total <0.2 0.0 - 1.2 mg/dL    Alk. phosphatase 123 (H) 39 - 117 IU/L    AST (SGOT) 9 0 - 40 IU/L    ALT (SGPT) 7 0 - 32 IU/L       Assessment/Plan:    ICD-10-CM ICD-9-CM    1. Fibromyositis  M79.7 729.1    2. Idiopathic peripheral neuropathy  G60.9 356.9    3. Essential hypertension  I10 401.9 CBC WITH AUTOMATED DIFF      METABOLIC PANEL, COMPREHENSIVE      TSH 3RD GENERATION      T4, FREE   4. Pure hypercholesterolemia  E78.00 272.0 LIPID PANEL   5. Weight loss, unintentional  R63.4 783.21    6.  Abdominal aortic aneurysm (AAA) without rupture ([facility] [address]  I71.4 441.4      Orders Placed This Encounter    CBC WITH AUTOMATED DIFF    METABOLIC PANEL, COMPREHENSIVE    TSH 3RD GENERATION    T4, FREE    LIPID PANEL       continue present plan, have labs drawn prior to ROV, call if any problems    There are no Patient Instructions on file for this visit. Follow-up and Dispositions    · Return in about 4 months (around 3/23/2022).

## 2021-11-23 NOTE — PROGRESS NOTES
Chief Complaint   Patient presents with    Follow Up Chronic Condition                 Visit Vitals  BP (!) 148/90 (BP 1 Location: Left upper arm, BP Patient Position: Sitting)   Pulse 64   Temp 97.8 °F (36.6 °C)   Resp 16   Ht 5' 2\" (1.575 m)   Wt 135 lb 6.4 oz (61.4 kg)   SpO2 98%   BMI 24.76 kg/m²               1. Have you been to the ER, urgent care clinic since your last visit? Hospitalized since your last visit? No    2. Have you seen or consulted any other health care providers outside of the [address] [place] [address] since your last visit? Include any pap smears or colon screening.  No

## 2021-11-24 DIAGNOSIS — N28.9 RENAL IMPAIRMENT: ICD-10-CM

## 2021-11-24 DIAGNOSIS — R63.4 WEIGHT LOSS, UNINTENTIONAL: ICD-10-CM

## 2021-11-24 DIAGNOSIS — I10 ESSENTIAL HYPERTENSION: Primary | ICD-10-CM

## 2021-11-24 LAB
ALBUMIN SERPL-MCNC: 4.6 G/DL (ref 3.8–4.8)
ALBUMIN/GLOB SERPL: 1.8 {RATIO} (ref 1.2–2.2)
ALP SERPL-CCNC: 94 IU/L (ref 44–121)
ALT SERPL-CCNC: 9 IU/L (ref 0–32)
AST SERPL-CCNC: 11 IU/L (ref 0–40)
BASOPHILS # BLD AUTO: 0 X10E3/UL (ref 0–0.2)
BASOPHILS NFR BLD AUTO: 1 %
BILIRUB SERPL-MCNC: 0.3 MG/DL (ref 0–1.2)
BUN SERPL-MCNC: 22 MG/DL (ref 8–27)
BUN/CREAT SERPL: 12 (ref 12–28)
CALCIUM SERPL-MCNC: 10.9 MG/DL (ref 8.7–10.3)
CHLORIDE SERPL-SCNC: 109 MMOL/L (ref 96–106)
CHOLEST SERPL-MCNC: 188 MG/DL (ref 100–199)
CO2 SERPL-SCNC: 20 MMOL/L (ref 20–29)
CREAT SERPL-MCNC: 1.82 MG/DL (ref 0.57–1)
EOSINOPHIL # BLD AUTO: 0.3 X10E3/UL (ref 0–0.4)
EOSINOPHIL NFR BLD AUTO: 4 %
ERYTHROCYTE [DISTWIDTH] IN BLOOD BY AUTOMATED COUNT: 15.6 % (ref 11.7–15.4)
GLOBULIN SER CALC-MCNC: 2.5 G/DL (ref 1.5–4.5)
GLUCOSE SERPL-MCNC: 95 MG/DL (ref 65–99)
HCT VFR BLD AUTO: 39.3 % (ref 34–46.6)
HDLC SERPL-MCNC: 62 MG/DL
HGB BLD-MCNC: 12.8 G/DL (ref 11.1–15.9)
IMM GRANULOCYTES # BLD AUTO: 0 X10E3/UL (ref 0–0.1)
IMM GRANULOCYTES NFR BLD AUTO: 0 %
LDLC SERPL CALC-MCNC: 104 MG/DL (ref 0–99)
LYMPHOCYTES # BLD AUTO: 2 X10E3/UL (ref 0.7–3.1)
LYMPHOCYTES NFR BLD AUTO: 27 %
MCH RBC QN AUTO: 26.8 PG (ref 26.6–33)
MCHC RBC AUTO-ENTMCNC: 32.6 G/DL (ref 31.5–35.7)
MCV RBC AUTO: 82 FL (ref 79–97)
MONOCYTES # BLD AUTO: 0.3 X10E3/UL (ref 0.1–0.9)
MONOCYTES NFR BLD AUTO: 5 %
NEUTROPHILS # BLD AUTO: 4.6 X10E3/UL (ref 1.4–7)
NEUTROPHILS NFR BLD AUTO: 63 %
PLATELET # BLD AUTO: 152 X10E3/UL (ref 150–450)
POTASSIUM SERPL-SCNC: 4.5 MMOL/L (ref 3.5–5.2)
PROT SERPL-MCNC: 7.1 G/DL (ref 6–8.5)
RBC # BLD AUTO: 4.77 X10E6/UL (ref 3.77–5.28)
SODIUM SERPL-SCNC: 142 MMOL/L (ref 134–144)
T4 FREE SERPL-MCNC: 1.33 NG/DL (ref 0.82–1.77)
TRIGL SERPL-MCNC: 125 MG/DL (ref 0–149)
TSH SERPL DL<=0.005 MIU/L-ACNC: 2.09 UIU/ML (ref 0.45–4.5)
VLDLC SERPL CALC-MCNC: 22 MG/DL (ref 5–40)
WBC # BLD AUTO: 7.2 X10E3/UL (ref 3.4–10.8)

## 2021-11-25 NOTE — PROGRESS NOTES
Notify the patient complete blood count and hemoglobin normal electrolytes normal however kidney function test is a little high unclear why hopefully she is not dehydrated. Would suggest she get a CT scan noncontrast of the abdomen and pelvis to look for any reason why she is lost weight and to try to look at the kidneys.   I will order the imaging studies and will also order chest x-ray

## 2021-12-08 ENCOUNTER — HOSPITAL ENCOUNTER (OUTPATIENT)
Dept: CT IMAGING | Age: 71
Discharge: HOME | End: 2021-12-08
Attending: INTERNAL MEDICINE
Payer: MEDICARE

## 2021-12-08 ENCOUNTER — HOSPITAL ENCOUNTER (OUTPATIENT)
Dept: GENERAL RADIOLOGY | Age: 71
Discharge: HOME | End: 2021-12-08
Attending: INTERNAL MEDICINE
Payer: MEDICARE

## 2021-12-08 DIAGNOSIS — R63.4 WEIGHT LOSS, UNINTENTIONAL: ICD-10-CM

## 2021-12-08 DIAGNOSIS — I10 ESSENTIAL HYPERTENSION: ICD-10-CM

## 2021-12-08 DIAGNOSIS — N28.9 RENAL IMPAIRMENT: ICD-10-CM

## 2021-12-08 PROCEDURE — 74150 CT ABDOMEN W/O CONTRAST: CPT

## 2021-12-08 PROCEDURE — 71046 X-RAY EXAM CHEST 2 VIEWS: CPT

## 2021-12-09 NOTE — PROGRESS NOTES
Notify the patient CT scan shows aneurysm is at 4.9 cm. We need to send this report and the disc to her vascular surgeon.   She needs to follow-up as she is scheduled to do so in the next month or so

## 2021-12-27 RX ORDER — PRAVASTATIN SODIUM 40 MG/1
TABLET ORAL
Qty: 90 TABLET | Refills: 0 | Status: SHIPPED | OUTPATIENT
Start: 2021-12-27 | End: 2022-03-29 | Stop reason: SDUPTHER

## 2022-01-11 RX ORDER — CITALOPRAM 10 MG/1
10 TABLET ORAL DAILY
Qty: 90 TABLET | Refills: 0 | Status: SHIPPED | OUTPATIENT
Start: 2022-01-11 | End: 2022-04-11

## 2022-01-11 RX ORDER — BENZONATATE 100 MG/1
100 CAPSULE ORAL
Qty: 30 CAPSULE | Refills: 0 | Status: SHIPPED | OUTPATIENT
Start: 2022-01-11 | End: 2022-01-21

## 2022-01-20 RX ORDER — CARVEDILOL 12.5 MG/1
TABLET ORAL
Qty: 180 TABLET | Refills: 0 | Status: SHIPPED | OUTPATIENT
Start: 2022-01-20 | End: 2022-01-24

## 2022-01-24 RX ORDER — CARVEDILOL 12.5 MG/1
TABLET ORAL
Qty: 180 TABLET | Refills: 0 | Status: SHIPPED | OUTPATIENT
Start: 2022-01-24 | End: 2022-04-24

## 2022-02-11 ENCOUNTER — TELEPHONE (OUTPATIENT)
Dept: INTERNAL MEDICINE CLINIC | Age: 72
End: 2022-02-11

## 2022-02-11 RX ORDER — CLONIDINE 0.2 MG/24H
1 PATCH, EXTENDED RELEASE TRANSDERMAL
Qty: 12 PATCH | Refills: 0 | Status: SHIPPED | OUTPATIENT
Start: 2022-02-11 | End: 2022-05-17

## 2022-02-11 NOTE — TELEPHONE ENCOUNTER
[facility]  [facility] faxed request for    Clonidine 0.2 mg/day patch  QTY Requsted:  12  Refills: 1  Days Supply:  84    LOV 11/23/22

## 2022-03-18 PROBLEM — J06.9 URI (UPPER RESPIRATORY INFECTION): Status: ACTIVE | Noted: 2020-09-23

## 2022-03-18 PROBLEM — M54.50 LOW BACK PAIN: Status: ACTIVE | Noted: 2020-09-23

## 2022-03-18 PROBLEM — I71.40 ABDOMINAL AORTIC ANEURYSM (AAA) (HCC): Status: ACTIVE | Noted: 2020-09-23

## 2022-03-18 PROBLEM — G60.9 IDIOPATHIC PERIPHERAL NEUROPATHY: Status: ACTIVE | Noted: 2020-09-23

## 2022-03-18 PROBLEM — H81.10 BENIGN PAROXYSMAL POSITIONAL VERTIGO: Status: ACTIVE | Noted: 2020-09-23

## 2022-03-19 PROBLEM — E78.5 HYPERLIPIDEMIA: Status: ACTIVE | Noted: 2020-09-23

## 2022-03-19 PROBLEM — K58.9 IRRITABLE BOWEL SYNDROME: Status: ACTIVE | Noted: 2020-09-23

## 2022-03-19 PROBLEM — L25.9 CONTACT DERMATITIS: Status: ACTIVE | Noted: 2020-09-23

## 2022-03-19 PROBLEM — F32.9 REACTIVE DEPRESSION: Status: ACTIVE | Noted: 2020-09-23

## 2022-03-19 PROBLEM — F17.200 TOBACCO DEPENDENCE SYNDROME: Status: ACTIVE | Noted: 2020-09-23

## 2022-03-19 PROBLEM — R79.89 AZOTEMIA: Status: ACTIVE | Noted: 2020-09-23

## 2022-03-19 PROBLEM — I10 ESSENTIAL HYPERTENSION: Status: ACTIVE | Noted: 2020-09-23

## 2022-03-19 PROBLEM — R60.9 EDEMA: Status: ACTIVE | Noted: 2020-09-23

## 2022-03-19 PROBLEM — M15.9 DEGENERATIVE JOINT DISEASE INVOLVING MULTIPLE JOINTS: Status: ACTIVE | Noted: 2020-09-23

## 2022-03-19 PROBLEM — J45.909 ASTHMATIC BRONCHITIS: Status: ACTIVE | Noted: 2020-09-23

## 2022-03-20 PROBLEM — G47.00 INSOMNIA: Status: ACTIVE | Noted: 2020-09-23

## 2022-03-20 PROBLEM — M79.7 FIBROMYOSITIS: Status: ACTIVE | Noted: 2020-09-23

## 2022-03-20 PROBLEM — M79.7 FIBROMYALGIA: Status: ACTIVE | Noted: 2020-09-23

## 2022-03-29 ENCOUNTER — TELEPHONE (OUTPATIENT)
Dept: INTERNAL MEDICINE CLINIC | Age: 72
End: 2022-03-29

## 2022-03-29 RX ORDER — PRAVASTATIN SODIUM 40 MG/1
40 TABLET ORAL
Qty: 90 TABLET | Refills: 1 | Status: SHIPPED | OUTPATIENT
Start: 2022-03-29 | End: 2022-10-04 | Stop reason: SDUPTHER

## 2022-03-29 NOTE — TELEPHONE ENCOUNTER
Former Dr Pilo Mccall faxed request for    Pravastatin Sodium 40 mg Tab  Take 1 tab by mouth every night    LOV 11/23/21 Doug ROMERO 5/10/22 Larry Correa

## 2022-04-08 ENCOUNTER — TELEPHONE (OUTPATIENT)
Dept: INTERNAL MEDICINE CLINIC | Age: 72
End: 2022-04-08

## 2022-04-08 RX ORDER — CITALOPRAM 10 MG/1
10 TABLET ORAL DAILY
Qty: 90 TABLET | Refills: 0 | OUTPATIENT
Start: 2022-04-08 | End: 2022-07-07

## 2022-04-13 ENCOUNTER — TELEPHONE (OUTPATIENT)
Dept: INTERNAL MEDICINE CLINIC | Age: 72
End: 2022-04-13

## 2022-04-13 NOTE — TELEPHONE ENCOUNTER
[address] faxed refill request for the following medication:      Citalopram HBR 10 MG Tablet   QTY: 90  Take 1 tablet by mouth once daily        LOV 11-  NOV 5- (Dr. Mike Bashir)

## 2022-04-14 DIAGNOSIS — F32.9 REACTIVE DEPRESSION: Primary | ICD-10-CM

## 2022-04-16 RX ORDER — CITALOPRAM 10 MG/1
10 TABLET ORAL DAILY
Qty: 90 TABLET | Refills: 0 | Status: CANCELLED | OUTPATIENT
Start: 2022-04-16 | End: 2022-07-15

## 2022-04-19 ENCOUNTER — TRANSCRIBE ORDER (OUTPATIENT)
Dept: SCHEDULING | Age: 72
End: 2022-04-19

## 2022-04-19 DIAGNOSIS — E21.0 PRIMARY HYPERPARATHYROIDISM (HCC): Primary | ICD-10-CM

## 2022-05-10 ENCOUNTER — TELEPHONE (OUTPATIENT)
Dept: INTERNAL MEDICINE CLINIC | Age: 72
End: 2022-05-10

## 2022-05-10 ENCOUNTER — OFFICE VISIT (OUTPATIENT)
Dept: FAMILY MEDICINE CLINIC | Age: 72
End: 2022-05-10
Payer: MEDICARE

## 2022-05-10 VITALS
WEIGHT: 130.8 LBS | RESPIRATION RATE: 18 BRPM | DIASTOLIC BLOOD PRESSURE: 72 MMHG | HEIGHT: 62 IN | TEMPERATURE: 97.4 F | BODY MASS INDEX: 24.07 KG/M2 | SYSTOLIC BLOOD PRESSURE: 116 MMHG | HEART RATE: 72 BPM | OXYGEN SATURATION: 98 %

## 2022-05-10 DIAGNOSIS — I71.40 ABDOMINAL AORTIC ANEURYSM (AAA) WITHOUT RUPTURE: ICD-10-CM

## 2022-05-10 DIAGNOSIS — M79.7 FIBROMYALGIA: ICD-10-CM

## 2022-05-10 DIAGNOSIS — Z71.6 TOBACCO ABUSE COUNSELING: ICD-10-CM

## 2022-05-10 DIAGNOSIS — K59.09 CHRONIC CONSTIPATION: ICD-10-CM

## 2022-05-10 DIAGNOSIS — E78.2 MIXED HYPERLIPIDEMIA: ICD-10-CM

## 2022-05-10 DIAGNOSIS — N18.4 CKD (CHRONIC KIDNEY DISEASE) STAGE 4, GFR 15-29 ML/MIN (HCC): ICD-10-CM

## 2022-05-10 DIAGNOSIS — F17.200 TOBACCO DEPENDENCE SYNDROME: ICD-10-CM

## 2022-05-10 DIAGNOSIS — I11.9 MALIGNANT HYPERTENSIVE HEART DISEASE WITHOUT HEART FAILURE: Primary | ICD-10-CM

## 2022-05-10 PROBLEM — N18.30 CHRONIC RENAL DISEASE, STAGE III (HCC): Status: ACTIVE | Noted: 2022-05-10

## 2022-05-10 PROCEDURE — G9717 DOC PT DX DEP/BP F/U NT REQ: HCPCS | Performed by: FAMILY MEDICINE

## 2022-05-10 PROCEDURE — 1090F PRES/ABSN URINE INCON ASSESS: CPT | Performed by: FAMILY MEDICINE

## 2022-05-10 PROCEDURE — G8400 PT W/DXA NO RESULTS DOC: HCPCS | Performed by: FAMILY MEDICINE

## 2022-05-10 PROCEDURE — G8536 NO DOC ELDER MAL SCRN: HCPCS | Performed by: FAMILY MEDICINE

## 2022-05-10 PROCEDURE — 99214 OFFICE O/P EST MOD 30 MIN: CPT | Performed by: FAMILY MEDICINE

## 2022-05-10 PROCEDURE — G8752 SYS BP LESS 140: HCPCS | Performed by: FAMILY MEDICINE

## 2022-05-10 PROCEDURE — G8754 DIAS BP LESS 90: HCPCS | Performed by: FAMILY MEDICINE

## 2022-05-10 PROCEDURE — 3017F COLORECTAL CA SCREEN DOC REV: CPT | Performed by: FAMILY MEDICINE

## 2022-05-10 PROCEDURE — G8427 DOCREV CUR MEDS BY ELIG CLIN: HCPCS | Performed by: FAMILY MEDICINE

## 2022-05-10 PROCEDURE — G8420 CALC BMI NORM PARAMETERS: HCPCS | Performed by: FAMILY MEDICINE

## 2022-05-10 PROCEDURE — 1101F PT FALLS ASSESS-DOCD LE1/YR: CPT | Performed by: FAMILY MEDICINE

## 2022-05-10 RX ORDER — ASPIRIN 81 MG/1
81 TABLET ORAL DAILY
COMMUNITY

## 2022-05-10 RX ORDER — POLYETHYLENE GLYCOL 3350 17 G/17G
17 POWDER, FOR SOLUTION ORAL
Qty: 30 PACKET | Refills: 1 | Status: SHIPPED | OUTPATIENT
Start: 2022-05-10 | End: 2022-06-09

## 2022-05-10 RX ORDER — DOCUSATE SODIUM 100 MG/1
100 CAPSULE, LIQUID FILLED ORAL 2 TIMES DAILY
Qty: 60 CAPSULE | Refills: 2 | Status: SHIPPED | OUTPATIENT
Start: 2022-05-10 | End: 2022-09-14

## 2022-05-10 RX ORDER — CARVEDILOL 12.5 MG/1
12.5 TABLET ORAL 2 TIMES DAILY WITH MEALS
COMMUNITY
End: 2022-08-08

## 2022-05-10 RX ORDER — CLOBETASOL PROPIONATE 0.5 MG/G
OINTMENT TOPICAL 2 TIMES DAILY
COMMUNITY

## 2022-05-10 NOTE — PROGRESS NOTES
Norm Bustillos is a 80 y.o. female and presents with New Patient (Former patient of dr Alexander Birch), Hypertension, Other (IBS), Fibromyalgia, and Insomnia  . 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 Denies abdominal pain or N and V. Admits to constipation x 1 week with no relief from senna tea  Subjective:  Cardiovascular Review:  The patient has hypertension   Diet and Lifestyle: generally follows a low fat low cholesterol diet, generally follows a low sodium diet, exercises sporadically  Home BP Monitoring: is not measured at home. Pertinent ROS: taking medications as instructed, no medication side effects noted, no TIA's, no chest pain on exertion, no dyspnea on exertion, no swelling of ankles. Review of Systems  Review of Systems   Constitutional: Negative. Negative for chills and fever. HENT: Negative. Negative for congestion, ear discharge, hearing loss, nosebleeds and tinnitus. Eyes: Negative. Negative for blurred vision, double vision, photophobia and pain. Respiratory: Negative. Negative for cough, hemoptysis and sputum production. Cardiovascular: Negative. Negative for chest pain and palpitations. Gastrointestinal: Positive for constipation. Negative for heartburn, nausea and vomiting. Genitourinary: Negative. Negative for dysuria, frequency and urgency. Musculoskeletal: Negative. Negative for back pain and myalgias. Skin: Negative. Neurological: Negative. Negative for dizziness, tingling, weakness and headaches. Endo/Heme/Allergies: Negative. Psychiatric/Behavioral: Negative. Negative for depression and suicidal ideas. The patient does not have insomnia. All other systems reviewed and are negative.         Past Medical History:   Diagnosis Date    Abdominal aortic aneurysm (AAA) ([facility] [address] 9/23/2020    Asthmatic bronchitis 9/23/2020    Azotemia 9/23/2020  Benign paroxysmal positional vertigo 9/23/2020    Contact dermatitis 9/23/2020    Degenerative joint disease involving multiple joints 9/23/2020    Edema 9/23/2020    Essential hypertension 9/23/2020    Fibromyalgia 9/23/2020    Fibromyositis 9/23/2020    Hyperlipidemia 9/23/2020    Idiopathic peripheral neuropathy 9/23/2020    Insomnia 9/23/2020    Irritable bowel syndrome 9/23/2020    Low back pain 9/23/2020    Reactive depression 9/23/2020    Tobacco dependence syndrome 9/23/2020    URI (upper respiratory infection) 9/23/2020     Past Surgical History:   Procedure Laterality Date    HX CHOLECYSTECTOMY  1996    HX HYSTERECTOMY  1995    HX KNEE REPLACEMENT  1980    HX ORTHOPAEDIC Bilateral 2000    carpal tunnel release    HX ORTHOPAEDIC  1996    Right thumb surgery    HX PARATHYROIDECTOMY  1985     Social History     Socioeconomic History    Marital status:    Tobacco Use    Smoking status: Current Every Day Smoker     Packs/day: 0.50     Years: 18.00     Pack years: 9.00     Types: Cigarettes    Smokeless tobacco: Never Used   Vaping Use    Vaping Use: Never used   Substance and Sexual Activity    Alcohol use: Never    Drug use: Never     Social Determinants of Health     Financial Resource Strain: Low Risk     Difficulty of Paying Living Expenses: Not hard at all   Food Insecurity: No Food Insecurity    Worried About Running Out of Food in the Last Year: Never true    Miya of Food in the Last Year: Never true   Transportation Needs: No Transportation Needs    Lack of Transportation (Medical): No    Lack of Transportation (Non-Medical): No   Physical Activity: Unknown    Minutes of Exercise per Session: 0 min   Social Connections: Moderately Integrated    Frequency of Communication with Friends and Family: Three times a week    Frequency of Social Gatherings with Friends and Family:  Three times a week    Attends Congregational Services: More than 4 times per year    Active Member of Clubs or Organizations: No    Attends Club or Organization Meetings: Never    Marital Status:    Housing Stability: Low Risk     Unable to Pay for Housing in the Last Year: No    Number of [place] in the Last Year: 1    Unstable Housing in the Last Year: No     Family History   Problem Relation Age of Onset    Hypertension Mother     Stroke Mother     Heart Disease Mother     Hypertension Father     Stroke Father      Current Outpatient Medications   Medication Sig Dispense Refill    aspirin delayed-release 81 mg tablet Take 81 mg by mouth daily.  carvediloL (COREG) 12.5 mg tablet Take 12.5 mg by mouth two (2) times daily (with meals).  clobetasoL (TEMOVATE) 0.05 % ointment Apply  to affected area two (2) times a day.  pravastatin (PRAVACHOL) 40 mg tablet Take 1 Tablet by mouth nightly. Indications: high cholesterol 90 Tablet 1    cloNIDine (CATAPRES) 0.2 mg/24 hr ptwk 1 Patch by TransDERmal route every seven (7) days for 90 days. 12 Patch 0    acetaminophen-codeine (TYLENOL #3) 300-30 mg per tablet Take 1 Tab by mouth as needed.  gabapentin (NEURONTIN) 100 mg capsule Take 2 Caps by mouth three (3) times daily.  methocarbamoL (ROBAXIN) 750 mg tablet Take 2 Tabs by mouth as needed.  NIFEdipine ER (ADALAT CC) 60 mg ER tablet Take 1 Tab by mouth two (2) times a day.  hydrALAZINE (APRESOLINE) 50 mg tablet take 1 tablet by mouth three times a day 270 Tab 1    lidocaine (LIDODERM) 5 % as needed. (Patient not taking: Reported on 11/23/2021)      dextromethorphan-guaiFENesin (Robitussin Cough-Chest Sunday DM) 5-100 mg/5 mL liqd Take 5 mL by mouth four (4) times daily as needed for Cough or Congestion.  (Patient not taking: Reported on 11/23/2021) 120 mL 0    DULoxetine 40 mg cpDR take 1 capsule by mouth once daily (Patient not taking: Reported on 11/23/2021) 90 Cap 1     Allergies   Allergen Reactions    Dexamethasone Other (comments)     Edema Objective:  Visit Vitals  /72   Pulse 72   Temp 97.4 °F (36.3 °C) (Temporal)   Resp 18   Ht 5' 2\" (1.575 m)   Wt 130 lb 12.8 oz (59.3 kg)   SpO2 98%   BMI 23.92 kg/m²       Physical Exam:   Physical Exam  Vitals and nursing note reviewed. Constitutional:       Appearance: Normal appearance. She is obese. HENT:      Head: Normocephalic and atraumatic. Right Ear: Tympanic membrane, ear canal and external ear normal.      Left Ear: Tympanic membrane, ear canal and external ear normal.      Nose: Nose normal.      Mouth/Throat:      Mouth: Mucous membranes are moist.      Pharynx: Oropharynx is clear. No oropharyngeal exudate or posterior oropharyngeal erythema. Eyes:      General: No scleral icterus. Right eye: No discharge. Left eye: No discharge. Extraocular Movements: Extraocular movements intact. Conjunctiva/sclera: Conjunctivae normal.      Pupils: Pupils are equal, round, and reactive to light. Neck:      Vascular: No carotid bruit. Cardiovascular:      Rate and Rhythm: Normal rate. Pulses: Normal pulses. Heart sounds: Normal heart sounds. No murmur heard. No gallop. Pulmonary:      Effort: Pulmonary effort is normal. No respiratory distress. Breath sounds: Normal breath sounds. No stridor. No wheezing, rhonchi or rales. Chest:      Chest wall: No tenderness. Abdominal:      General: Bowel sounds are normal. There is no distension. Palpations: Abdomen is soft. There is no mass. Tenderness: There is no abdominal tenderness. There is no right CVA tenderness, left CVA tenderness or rebound. Hernia: No hernia is present. Musculoskeletal:         General: No swelling, tenderness, deformity or signs of injury. Normal range of motion. Cervical back: Normal range of motion and neck supple. No rigidity. No muscular tenderness. Right lower leg: No edema. Left lower leg: No edema.    Lymphadenopathy:      Cervical: No cervical adenopathy. Skin:     General: Skin is warm. Capillary Refill: Capillary refill takes 2 to 3 seconds. Coloration: Skin is not jaundiced or pale. Findings: No bruising, erythema, lesion or rash. Neurological:      General: No focal deficit present. Mental Status: She is alert and oriented to person, place, and time. Cranial Nerves: No cranial nerve deficit. Sensory: No sensory deficit. Motor: No weakness. Coordination: Coordination normal.      Gait: Gait normal.      Deep Tendon Reflexes: Reflexes normal.   Psychiatric:         Mood and Affect: Mood normal.         Behavior: Behavior normal.         Thought Content: Thought content normal.         Judgment: Judgment normal.             Results for orders placed or performed in visit on 11/23/21   CBC WITH AUTOMATED DIFF   Result Value Ref Range    WBC 7.2 3.4 - 10.8 x10E3/uL    RBC 4.77 3.77 - 5.28 x10E6/uL    HGB 12.8 11.1 - 15.9 g/dL    HCT 39.3 34.0 - 46.6 %    MCV 82 79 - 97 fL    MCH 26.8 26.6 - 33.0 pg    MCHC 32.6 31.5 - 35.7 g/dL    RDW 15.6 (H) 11.7 - 15.4 %    PLATELET 704 433 - 909 x10E3/uL    NEUTROPHILS 63 Not Estab. %    Lymphocytes 27 Not Estab. %    MONOCYTES 5 Not Estab. %    EOSINOPHILS 4 Not Estab. %    BASOPHILS 1 Not Estab. %    ABS. NEUTROPHILS 4.6 1.4 - 7.0 x10E3/uL    Abs Lymphocytes 2.0 0.7 - 3.1 x10E3/uL    ABS. MONOCYTES 0.3 0.1 - 0.9 x10E3/uL    ABS. EOSINOPHILS 0.3 0.0 - 0.4 x10E3/uL    ABS. BASOPHILS 0.0 0.0 - 0.2 x10E3/uL    IMMATURE GRANULOCYTES 0 Not Estab. %    ABS. IMM.  GRANS. 0.0 0.0 - 0.1 D40E3/ZF   METABOLIC PANEL, COMPREHENSIVE   Result Value Ref Range    Glucose 95 65 - 99 mg/dL    BUN 22 8 - 27 mg/dL    Creatinine 1.82 (H) 0.57 - 1.00 mg/dL    GFR est non-AA 28 (L) >59 mL/min/1.73    GFR est AA 32 (L) >59 mL/min/1.73    BUN/Creatinine ratio 12 12 - 28    Sodium 142 134 - 144 mmol/L    Potassium 4.5 3.5 - 5.2 mmol/L    Chloride 109 (H) 96 - 106 mmol/L    CO2 20 20 - 29 mmol/L    Calcium 10.9 (H) 8.7 - 10.3 mg/dL    Protein, total 7.1 6.0 - 8.5 g/dL    Albumin 4.6 3.8 - 4.8 g/dL    GLOBULIN, TOTAL 2.5 1.5 - 4.5 g/dL    A-G Ratio 1.8 1.2 - 2.2    Bilirubin, total 0.3 0.0 - 1.2 mg/dL    Alk. phosphatase 94 44 - 121 IU/L    AST (SGOT) 11 0 - 40 IU/L    ALT (SGPT) 9 0 - 32 IU/L   TSH 3RD GENERATION   Result Value Ref Range    TSH 2.090 0.450 - 4.500 uIU/mL   T4, FREE   Result Value Ref Range    T4, Free 1.33 0.82 - 1.77 ng/dL   LIPID PANEL   Result Value Ref Range    Cholesterol, total 188 100 - 199 mg/dL    Triglyceride 125 0 - 149 mg/dL    HDL Cholesterol 62 >39 mg/dL    VLDL, calculated 22 5 - 40 mg/dL    LDL, calculated 104 (H) 0 - 99 mg/dL       Assessment/Plan:    ICD-10-CM ICD-9-CM    1. Malignant hypertensive heart disease without heart failure  I11.9 402.00    2. CKD (chronic kidney disease) stage 4, GFR 15-29 ml/min (HCC)  N18.4 585.4    3. Abdominal aortic aneurysm (AAA) without rupture (HCC)  I71.4 441.4    4. Fibromyalgia  M79.7 729.1    5. Mixed hyperlipidemia  E78.2 272.2    6. Chronic constipation  K59.09 564.00    7. Tobacco dependence syndrome  F17.200 305.1    8. Tobacco abuse counseling  Z71.6 V65.42      305.1      Orders Placed This Encounter    aspirin delayed-release 81 mg tablet     Sig: Take 81 mg by mouth daily.  carvediloL (COREG) 12.5 mg tablet     Sig: Take 12.5 mg by mouth two (2) times daily (with meals).  clobetasoL (TEMOVATE) 0.05 % ointment     Sig: Apply  to affected area two (2) times a day. Cannot display discharge medications since this is not an admission.

## 2022-05-10 NOTE — PATIENT INSTRUCTIONS
Learning About Low-Fat Eating  What is low-fat eating? Most food has some fat in it. Your body needs some fat to be healthy. But some kinds of fats are healthier than others. In a low-fat eating plan, you try to choose healthier fats and eat fewer unhealthy fats. Healthy fats include olive and canola oil. Try to avoid eating too much saturated fat, such as in cheese and meats. You do not need to cut all fat from your diet. But you can make healthier choices about the types and amount of fat you eat. Even though it is a good idea to choose healthier fats, it is still important to be careful of how much fat you eat, because all fats are high in calories. What are the different types of fats? Unhealthy fat  · Saturated fat. Saturated fats are mostly in animal foods, such as meat and dairy foods. Tropical oils, such as coconut oil, palm oil, and cocoa butter, are also saturated fats. Healthy fats  · Monounsaturated fat. Monounsaturated fats are liquid at room temperature but get solid when refrigerated. Eating foods that are high in this fat may help lower your \"bad\" (LDL) cholesterol, keep your \"good\" (HDL) cholesterol level up, and lower your chances of getting coronary artery disease. This fat is found in canola oil, olive oil, peanut oil, olives, avocados, nuts, and nut butters. · Polyunsaturated fat. Polyunsaturated fats are liquid at room temperature. They are in safflower, sunflower, and corn oils. They are also the main fat in seafood. Omega-3 fatty acids are types of polyunsaturated fat. Eating fish may lower your chances of getting coronary artery disease. Fatty fish such as salmon and mackerel contain these healthy fatty acids. So do ground flaxseeds and flaxseed oil, soybeans, walnuts, and seeds. Why cut down on unhealthy fats? Eating foods that contain saturated fats can raise the LDL (\"bad\") cholesterol in your blood.  Having a high level of LDL cholesterol increases your chance of hardening of the arteries (atherosclerosis), which can lead to heart disease, heart attack, and stroke. In general:  · No more than 10% of your daily calories should come from saturated fat. This is about 20 grams in a 2,000-calorie diet. · No more than 10% of your daily calories should come from polyunsaturated fat. This is about 20 grams in a 2,000-calorie diet. · Monounsaturated fats can be up to 15% of your daily calories. This is about 25 to 30 grams in a 2,000-calorie diet. If you're not sure how much fat you should be eating or how many calories you need each day to stay at a healthy weight, talk to a registered dietitian. A dietitian can help you create a plan that's right for you. What can you do to cut down on fat? Foods like cheese, butter, sausage, and desserts can have a lot of unhealthy fats. Try these tips for healthier meals at home and when you eat out. At home  · Fill up on fruits, vegetables, and whole grains. · Think of meat as a side dish instead of as the main part of your meal.  · When you do eat meat, make it extra-lean ground beef (97% lean), ground turkey breast (without skin added), meats with fat trimmed off before cooking, or skinless chicken. · Try main dishes that use whole wheat pasta, brown rice, dried beans, or vegetables. · Use cooking methods that use little or no fat, such as broiling, steaming, or grilling. Use cooking spray instead of oil. If you use oil, use a monounsaturated oil, such as canola or olive oil. · Read food labels on canned, bottled, or packaged foods. Choose those with little saturated fat. When eating out at a restaurant  · Order foods that are broiled or poached instead of fried or breaded. · Cut back on the amount of butter or margarine that you use on bread. Use small amounts of olive oil instead. · Order sauces, gravies, and salad dressings on the side, and use only a little.   · When you order pasta, choose tomato sauce instead of cream sauce.  · Ask for salsa with your baked potato instead of sour cream, butter, cheese, or flores. Where can you learn more? Go to http://www.gray.com/  Enter [id number] in the search box to learn more about \"Learning About Low-Fat Eating. \"  Current as of: September 8, 2021               Content Version: 13.2  © 0706-0219 [employer]. Care instructions adapted under license by [employer] (which disclaims liability or warranty for this information). If you have questions about a medical condition or this instruction, always ask your healthcare professional. [place] [address] any warranty or liability for your use of this information.

## 2022-05-10 NOTE — PROGRESS NOTES
1. \"Have you been to the ER, urgent care clinic since your last visit? Hospitalized since your last visit? \" Yes When: April 27 - May 1,2022 Where: [facility] Reason for visit: Aneurysm    2. \"Have you seen or consulted any other health care providers outside of the [address] [place] [address] since your last visit? \" No     3. For patients aged 39-70: Has the patient had a colonoscopy / FIT/ Cologuard? No      If the patient is female:    4. For patients aged 41-77: Has the patient had a mammogram within the past 2 years? No      5. For patients aged 21-65: Has the patient had a pap smear? NA - based on age or sex     Chief Complaint   Patient presents with    New Patient     Former patient of dr Jeronimo Green    Hypertension    Other     IBS    Fibromyalgia    Insomnia       Visit Vitals  /72   Pulse 72   Temp 97.4 °F (36.3 °C) (Temporal)   Resp 18   Ht 5' 2\" (1.575 m)   Wt 130 lb 12.8 oz (59.3 kg)   SpO2 98%   BMI 23.92 kg/m²       Patient is here as a new patient for Dr. Mello Lee and former patient of dr Jeronimo Green. Hx of hypertension, IBS, insomnia, Was in the hospital from 4-27-22 through 5-1-22 Aneurysm.   Left side and left back pain

## 2022-05-17 RX ORDER — CLONIDINE 0.2 MG/24H
PATCH, EXTENDED RELEASE TRANSDERMAL
Qty: 12 PATCH | Refills: 0 | Status: SHIPPED | OUTPATIENT
Start: 2022-05-17 | End: 2022-08-06

## 2022-06-22 ENCOUNTER — TELEPHONE (OUTPATIENT)
Dept: FAMILY MEDICINE CLINIC | Age: 72
End: 2022-06-22

## 2022-06-22 NOTE — TELEPHONE ENCOUNTER
Dr. Fozia Martinez the daughter of Jory Peterson left me a phone message stating that her mother went into the hospital [facility] on Rhea 10, 2022 with severe pain and also kidney problems.   She is on dialysis the daughter stated her mother has lost 10 lbs in the hospital and she will not eat she weighs 120 now the daughter wants to know if there is an appetite stimulant you can send to the pharmacy for her mother   Ramirez Clive [phone] or Ms Earlene Patricia  [phone]

## 2022-08-06 RX ORDER — CLONIDINE 0.2 MG/24H
PATCH, EXTENDED RELEASE TRANSDERMAL
Qty: 12 PATCH | Refills: 0 | Status: SHIPPED | OUTPATIENT
Start: 2022-08-06 | End: 2022-11-02

## 2022-08-08 RX ORDER — CARVEDILOL 12.5 MG/1
TABLET ORAL
Qty: 180 TABLET | Refills: 0 | Status: SHIPPED | OUTPATIENT
Start: 2022-08-08 | End: 2022-11-04

## 2022-08-16 LAB
ALBUMIN SERPL-MCNC: 4.1 G/DL (ref 3.8–4.8)
ALBUMIN/GLOB SERPL: 1.8 {RATIO} (ref 1.2–2.2)
ALP SERPL-CCNC: 65 IU/L (ref 44–121)
ALT SERPL-CCNC: 8 IU/L (ref 0–32)
AST SERPL-CCNC: 15 IU/L (ref 0–40)
BILIRUB SERPL-MCNC: 0.2 MG/DL (ref 0–1.2)
BUN SERPL-MCNC: 20 MG/DL (ref 8–27)
BUN/CREAT SERPL: 3 (ref 12–28)
CALCIUM SERPL-MCNC: 9.4 MG/DL (ref 8.7–10.3)
CHLORIDE SERPL-SCNC: 97 MMOL/L (ref 96–106)
CHOLEST SERPL-MCNC: 206 MG/DL (ref 100–199)
CO2 SERPL-SCNC: 24 MMOL/L (ref 20–29)
CREAT SERPL-MCNC: 6.25 MG/DL (ref 0.57–1)
EGFR: 7 ML/MIN/1.73
GLOBULIN SER CALC-MCNC: 2.3 G/DL (ref 1.5–4.5)
GLUCOSE SERPL-MCNC: 80 MG/DL (ref 65–99)
HDLC SERPL-MCNC: 71 MG/DL
LDLC SERPL CALC-MCNC: 115 MG/DL (ref 0–99)
POTASSIUM SERPL-SCNC: 3.7 MMOL/L (ref 3.5–5.2)
PROT SERPL-MCNC: 6.4 G/DL (ref 6–8.5)
SODIUM SERPL-SCNC: 146 MMOL/L (ref 134–144)
TRIGL SERPL-MCNC: 116 MG/DL (ref 0–149)
VLDLC SERPL CALC-MCNC: 20 MG/DL (ref 5–40)

## 2022-08-30 ENCOUNTER — OFFICE VISIT (OUTPATIENT)
Dept: FAMILY MEDICINE CLINIC | Age: 72
End: 2022-08-30
Payer: MEDICARE

## 2022-08-30 VITALS
BODY MASS INDEX: 20.8 KG/M2 | HEART RATE: 69 BPM | RESPIRATION RATE: 16 BRPM | WEIGHT: 113 LBS | HEIGHT: 62 IN | SYSTOLIC BLOOD PRESSURE: 122 MMHG | DIASTOLIC BLOOD PRESSURE: 68 MMHG | OXYGEN SATURATION: 98 % | TEMPERATURE: 98.1 F

## 2022-08-30 DIAGNOSIS — M79.7 FIBROMYOSITIS: ICD-10-CM

## 2022-08-30 DIAGNOSIS — I71.40 ABDOMINAL AORTIC ANEURYSM (AAA) WITHOUT RUPTURE: ICD-10-CM

## 2022-08-30 DIAGNOSIS — K58.8 OTHER IRRITABLE BOWEL SYNDROME: ICD-10-CM

## 2022-08-30 DIAGNOSIS — M79.7 FIBROMYALGIA: ICD-10-CM

## 2022-08-30 DIAGNOSIS — F32.9 REACTIVE DEPRESSION: ICD-10-CM

## 2022-08-30 DIAGNOSIS — Z99.2 ESRD ON DIALYSIS (HCC): ICD-10-CM

## 2022-08-30 DIAGNOSIS — N18.6 ESRD ON DIALYSIS (HCC): ICD-10-CM

## 2022-08-30 DIAGNOSIS — Z00.00 MEDICARE ANNUAL WELLNESS VISIT, SUBSEQUENT: ICD-10-CM

## 2022-08-30 DIAGNOSIS — I11.9 MALIGNANT HYPERTENSIVE HEART DISEASE WITHOUT HEART FAILURE: ICD-10-CM

## 2022-08-30 DIAGNOSIS — N18.4 CKD (CHRONIC KIDNEY DISEASE) STAGE 4, GFR 15-29 ML/MIN (HCC): Primary | ICD-10-CM

## 2022-08-30 DIAGNOSIS — N25.81 HYPERPARATHYROIDISM, SECONDARY RENAL (HCC): ICD-10-CM

## 2022-08-30 DIAGNOSIS — K59.09 CHRONIC CONSTIPATION: ICD-10-CM

## 2022-08-30 DIAGNOSIS — Z87.891 QUIT SMOKING: ICD-10-CM

## 2022-08-30 PROCEDURE — G8536 NO DOC ELDER MAL SCRN: HCPCS | Performed by: FAMILY MEDICINE

## 2022-08-30 PROCEDURE — G8427 DOCREV CUR MEDS BY ELIG CLIN: HCPCS | Performed by: FAMILY MEDICINE

## 2022-08-30 PROCEDURE — G8420 CALC BMI NORM PARAMETERS: HCPCS | Performed by: FAMILY MEDICINE

## 2022-08-30 PROCEDURE — G9717 DOC PT DX DEP/BP F/U NT REQ: HCPCS | Performed by: FAMILY MEDICINE

## 2022-08-30 PROCEDURE — 1101F PT FALLS ASSESS-DOCD LE1/YR: CPT | Performed by: FAMILY MEDICINE

## 2022-08-30 PROCEDURE — 1123F ACP DISCUSS/DSCN MKR DOCD: CPT | Performed by: FAMILY MEDICINE

## 2022-08-30 PROCEDURE — 3017F COLORECTAL CA SCREEN DOC REV: CPT | Performed by: FAMILY MEDICINE

## 2022-08-30 PROCEDURE — G0439 PPPS, SUBSEQ VISIT: HCPCS | Performed by: FAMILY MEDICINE

## 2022-08-30 PROCEDURE — 1090F PRES/ABSN URINE INCON ASSESS: CPT | Performed by: FAMILY MEDICINE

## 2022-08-30 PROCEDURE — G9231 DOC ESRD DIA TRANS PREG: HCPCS | Performed by: FAMILY MEDICINE

## 2022-08-30 PROCEDURE — 99214 OFFICE O/P EST MOD 30 MIN: CPT | Performed by: FAMILY MEDICINE

## 2022-08-30 PROCEDURE — G8400 PT W/DXA NO RESULTS DOC: HCPCS | Performed by: FAMILY MEDICINE

## 2022-08-30 RX ORDER — SERTRALINE HYDROCHLORIDE 25 MG/1
25 TABLET, FILM COATED ORAL DAILY
Qty: 30 TABLET | Refills: 2 | Status: SHIPPED | OUTPATIENT
Start: 2022-08-30 | End: 2022-09-29

## 2022-08-30 NOTE — PROGRESS NOTES
Kodak Patterson is a 80 y.o. female and presents with Welcome To Medicare and Annual Wellness Visit  . HPI     Subjective:  Cardiovascular Review:  The patient has hypertension   Diet and Lifestyle: generally follows a low fat low cholesterol diet, generally follows a low sodium diet, exercises sporadically  Home BP Monitoring: is not measured at home. Pertinent ROS: taking medications as instructed, no medication side effects noted, no TIA's, no chest pain on exertion, no dyspnea on exertion, no swelling of ankles. Review of Systems  Review of Systems   Constitutional: Negative. Negative for chills and fever. HENT: Negative. Negative for congestion, ear discharge, hearing loss, nosebleeds and tinnitus. Eyes: Negative. Negative for blurred vision, double vision, photophobia and pain. Respiratory: Negative. Negative for cough, hemoptysis and sputum production. Cardiovascular: Negative. Negative for chest pain and palpitations. Gastrointestinal: Negative. Negative for heartburn, nausea and vomiting. Genitourinary: Negative. Negative for dysuria, frequency and urgency. Musculoskeletal: Negative. Negative for back pain and myalgias. Skin: Negative. Neurological: Negative. Negative for dizziness, tingling, weakness and headaches. Endo/Heme/Allergies: Negative. Psychiatric/Behavioral: Negative. Negative for depression and suicidal ideas. The patient does not have insomnia. All other systems reviewed and are negative.       Past Medical History:   Diagnosis Date    Abdominal aortic aneurysm (AAA) ([facility] [address] 9/23/2020    Asthmatic bronchitis 9/23/2020    Azotemia 9/23/2020    Benign paroxysmal positional vertigo 9/23/2020    Contact dermatitis 9/23/2020    Degenerative joint disease involving multiple joints 9/23/2020    Edema 9/23/2020    Essential hypertension 9/23/2020    Fibromyalgia 9/23/2020    Fibromyositis 9/23/2020    Hyperlipidemia 9/23/2020    Idiopathic peripheral neuropathy 9/23/2020    Insomnia 9/23/2020    Irritable bowel syndrome 9/23/2020    Low back pain 9/23/2020    Reactive depression 9/23/2020    Tobacco dependence syndrome 9/23/2020    URI (upper respiratory infection) 9/23/2020     Past Surgical History:   Procedure Laterality Date    HX CHOLECYSTECTOMY  1996    HX HYSTERECTOMY  1995    HX KNEE REPLACEMENT  1980    HX ORTHOPAEDIC Bilateral 2000    carpal tunnel release    HX ORTHOPAEDIC  1996    Right thumb surgery    HX PARATHYROIDECTOMY  1985     Social History     Socioeconomic History    Marital status:    Tobacco Use    Smoking status: Every Day     Packs/day: 0.50     Years: 18.00     Pack years: 9.00     Types: Cigarettes    Smokeless tobacco: Never   Vaping Use    Vaping Use: Never used   Substance and Sexual Activity    Alcohol use: Never    Drug use: Never     Social Determinants of Health     Financial Resource Strain: Low Risk     Difficulty of Paying Living Expenses: Not hard at all   Food Insecurity: No Food Insecurity    Worried About Running Out of Food in the Last Year: Never true    Ran Out of Food in the Last Year: Never true   Transportation Needs: No Transportation Needs    Lack of Transportation (Medical): No    Lack of Transportation (Non-Medical): No   Physical Activity: Unknown    Minutes of Exercise per Session: 0 min   Social Connections: Moderately Integrated    Frequency of Communication with Friends and Family: Three times a week    Frequency of Social Gatherings with Friends and Family:  Three times a week    Attends Yazidism Services: More than 4 times per year    Active Member of Clubs or Organizations: No    Attends Club or Organization Meetings: Never    Marital Status:    Housing Stability: Low Risk     Unable to Pay for Housing in the Last Year: No    Number of Places Lived in the Last Year: 1    Unstable Housing in the Last Year: No     Family History   Problem Relation Age of Onset    Hypertension Mother Stroke Mother     Heart Disease Mother     Hypertension Father     Stroke Father      Current Outpatient Medications   Medication Sig Dispense Refill    sertraline (ZOLOFT) 25 mg tablet Take 1 Tablet by mouth daily for 30 days. Indications: major depressive disorder 30 Tablet 2    carvediloL (COREG) 12.5 mg tablet take 1 tablet by mouth twice a day 180 Tablet 0    cloNIDine (CATAPRES) 0.2 mg/24 hr ptwk APPLY 1 PATCH TO SKIN EVERY 7 DAYS 12 Patch 0    aspirin delayed-release 81 mg tablet Take 81 mg by mouth daily. clobetasoL (TEMOVATE) 0.05 % ointment Apply  to affected area two (2) times a day. pravastatin (PRAVACHOL) 40 mg tablet Take 1 Tablet by mouth nightly. Indications: high cholesterol 90 Tablet 1    acetaminophen-codeine (TYLENOL #3) 300-30 mg per tablet Take 1 Tab by mouth as needed. gabapentin (NEURONTIN) 100 mg capsule Take 2 Caps by mouth three (3) times daily. methocarbamoL (ROBAXIN) 750 mg tablet Take 2 Tabs by mouth as needed. NIFEdipine ER (ADALAT CC) 60 mg ER tablet Take 1 Tab by mouth two (2) times a day. hydrALAZINE (APRESOLINE) 50 mg tablet take 1 tablet by mouth three times a day 270 Tab 1    lidocaine (LIDODERM) 5 % as needed. (Patient not taking: No sig reported)      dextromethorphan-guaiFENesin (Robitussin Cough-Chest Sunday DM) 5-100 mg/5 mL liqd Take 5 mL by mouth four (4) times daily as needed for Cough or Congestion. (Patient not taking: No sig reported) 120 mL 0    DULoxetine 40 mg cpDR take 1 capsule by mouth once daily (Patient not taking: No sig reported) 90 Cap 1     Allergies   Allergen Reactions    Dexamethasone Other (comments)     Edema         Objective:  Visit Vitals  /68 (BP 1 Location: Left arm, BP Patient Position: Sitting, BP Cuff Size: Adult)   Pulse 69   Temp 98.1 °F (36.7 °C) (Oral)   Resp 16   Ht 5' 2\" (1.575 m)   Wt 113 lb (51.3 kg)   SpO2 98%   BMI 20.67 kg/m²       Physical Exam:   Physical Exam  Vitals and nursing note reviewed. Constitutional:       Appearance: Normal appearance. She is obese. HENT:      Head: Normocephalic and atraumatic. Right Ear: Tympanic membrane, ear canal and external ear normal.      Left Ear: Tympanic membrane, ear canal and external ear normal.      Nose: Nose normal.      Mouth/Throat:      Mouth: Mucous membranes are moist.      Pharynx: Oropharynx is clear. No oropharyngeal exudate or posterior oropharyngeal erythema. Eyes:      General: No scleral icterus. Right eye: No discharge. Left eye: No discharge. Extraocular Movements: Extraocular movements intact. Conjunctiva/sclera: Conjunctivae normal.      Pupils: Pupils are equal, round, and reactive to light. Neck:      Vascular: No carotid bruit. Cardiovascular:      Rate and Rhythm: Normal rate. Pulses: Normal pulses. Heart sounds: Normal heart sounds. No murmur heard. No gallop. Pulmonary:      Effort: Pulmonary effort is normal. No respiratory distress. Breath sounds: Normal breath sounds. No stridor. No wheezing, rhonchi or rales. Chest:      Chest wall: No tenderness. Abdominal:      General: Bowel sounds are normal. There is no distension. Palpations: Abdomen is soft. There is no mass. Tenderness: There is no abdominal tenderness. There is no right CVA tenderness, left CVA tenderness or rebound. Hernia: No hernia is present. Musculoskeletal:         General: No swelling, tenderness, deformity or signs of injury. Normal range of motion. Cervical back: Normal range of motion and neck supple. No rigidity. No muscular tenderness. Right lower leg: No edema. Left lower leg: No edema. Lymphadenopathy:      Cervical: No cervical adenopathy. Skin:     General: Skin is warm. Capillary Refill: Capillary refill takes 2 to 3 seconds. Coloration: Skin is not jaundiced or pale. Findings: No bruising, erythema, lesion or rash.    Neurological:      General: No focal deficit present. Mental Status: She is alert and oriented to person, place, and time. Cranial Nerves: No cranial nerve deficit. Sensory: No sensory deficit. Motor: No weakness. Coordination: Coordination normal.      Gait: Gait normal.      Deep Tendon Reflexes: Reflexes normal.   Psychiatric:         Mood and Affect: Mood normal.         Behavior: Behavior normal.         Thought Content: Thought content normal.         Judgment: Judgment normal.           Results for orders placed or performed in visit on 35/73/06   METABOLIC PANEL, COMPREHENSIVE   Result Value Ref Range    Glucose 80 65 - 99 mg/dL    BUN 20 8 - 27 mg/dL    Creatinine 6.25 (H) 0.57 - 1.00 mg/dL    eGFR 7 (L) >59 mL/min/1.73    BUN/Creatinine ratio 3 (L) 12 - 28    Sodium 146 (H) 134 - 144 mmol/L    Potassium 3.7 3.5 - 5.2 mmol/L    Chloride 97 96 - 106 mmol/L    CO2 24 20 - 29 mmol/L    Calcium 9.4 8.7 - 10.3 mg/dL    Protein, total 6.4 6.0 - 8.5 g/dL    Albumin 4.1 3.8 - 4.8 g/dL    GLOBULIN, TOTAL 2.3 1.5 - 4.5 g/dL    A-G Ratio 1.8 1.2 - 2.2    Bilirubin, total 0.2 0.0 - 1.2 mg/dL    Alk. phosphatase 65 44 - 121 IU/L    AST (SGOT) 15 0 - 40 IU/L    ALT (SGPT) 8 0 - 32 IU/L   LIPID PANEL   Result Value Ref Range    Cholesterol, total 206 (H) 100 - 199 mg/dL    Triglyceride 116 0 - 149 mg/dL    HDL Cholesterol 71 >39 mg/dL    VLDL, calculated 20 5 - 40 mg/dL    LDL, calculated 115 (H) 0 - 99 mg/dL       Assessment/Plan:    ICD-10-CM ICD-9-CM    1. CKD (chronic kidney disease) stage 4, GFR 15-29 ml/min (HCC)  N18.4 585.4       2. Malignant hypertensive heart disease without heart failure  I11.9 402.00       3. Abdominal aortic aneurysm (AAA) without rupture ([facility])  I71.4 441.4       4. Chronic constipation  K59.09 564.00       5. Other irritable bowel syndrome  K58.8 564.1       6. Fibromyositis  M79.7 729.1       7. ESRD on dialysis ([facility] [address]  N18.6 585.6     Z99.2 V45.11       8.  Hyperparathyroidism, secondary renal ([facility]  N25.81 588.81       9. Medicare annual wellness visit, subsequent  Z00.00 V70.0       10. Reactive depression  F32.9 300.4       11. Fibromyalgia  M79.7 729.1       12. Quit smoking  Z87. 891 V15.82         Orders Placed This Encounter    sertraline (ZOLOFT) 25 mg tablet     Sig: Take 1 Tablet by mouth daily for 30 days. Indications: major depressive disorder     Dispense:  30 Tablet     Refill:  2     Cannot display discharge medications since this is not an admission. This is the Subsequent Medicare Annual Wellness Exam, performed 12 months or more after the Initial AWV or the last Subsequent AWV    I have reviewed the patient's medical history in detail and updated the computerized patient record. Assessment/Plan   Education and counseling provided:  Are appropriate based on today's review and evaluation    1. CKD (chronic kidney disease) stage 4, GFR 15-29 ml/min (HCC)  2. Malignant hypertensive heart disease without heart failure  3. Abdominal aortic aneurysm (AAA) without rupture ([facility] [address]  4. Chronic constipation  5. Other irritable bowel syndrome  6. Fibromyositis  7. ESRD on dialysis ([facility] [address]  8. Hyperparathyroidism, secondary renal ([facility] [address]  9. Medicare annual wellness visit, subsequent  10. Reactive depression  11. Fibromyalgia  12.  Quit smoking       Depression Risk Factor Screening     3 most recent PHQ Screens 8/30/2022   Little interest or pleasure in doing things Not at all   Feeling down, depressed, irritable, or hopeless Several days   Total Score PHQ 2 1   Trouble falling or staying asleep, or sleeping too much Not at all   Feeling tired or having little energy Not at all   Poor appetite, weight loss, or overeating Not at all   Feeling bad about yourself - or that you are a failure or have let yourself or your family down Not at all   Trouble concentrating on things such as school, work, reading, or watching TV Not at all   Moving or speaking so slowly that other people could have noticed; or the opposite being so fidgety that others notice Not at all   Thoughts of being better off dead, or hurting yourself in some way Not at all   PHQ 9 Score 1   How difficult have these problems made it for you to do your work, take care of your home and get along with others Not difficult at all       Alcohol & Drug Abuse Risk Screen    Do you average more than 1 drink per night or more than 7 drinks a week:  No    On any one occasion in the past three months have you have had more than 3 drinks containing alcohol:  No          Functional Ability and Level of Safety    Hearing: Hearing is good. Activities of Daily Living: The home contains: no safety equipment. Patient does total self care      Ambulation: with no difficulty     Fall Risk:  Fall Risk Assessment, last 12 mths 8/30/2022   Able to walk? Yes   Fall in past 12 months? 0   Do you feel unsteady? 0   Are you worried about falling 0   Is TUG test greater than 12 seconds? -   Is the gait abnormal? -   Number of falls in past 12 months -   Fall with injury?  -      Abuse Screen:  Patient is not abused       Cognitive Screening    Has your family/caregiver stated any concerns about your memory: no     Cognitive Screening: Normal - Clock Drawing Test    Health Maintenance Due     Health Maintenance Due   Topic Date Due    Hepatitis C Screening  Never done    Shingrix Vaccine Age 49> (1 of 2) Never done    DTaP/Tdap/Td series (1 - Tdap) Never done    Colorectal Cancer Screening Combo  Never done    Breast Cancer Screen Mammogram  Never done    Bone Densitometry (Dexa) Screening  Never done    Medicare Yearly Exam  Never done    COVID-19 Vaccine (3 - Booster for Jackson Lust series) 08/16/2021       Patient Care Team   Patient Care Team:  Ivette Sanchez MD as PCP - General (Family Medicine)  Ivette Sanchez MD as PCP - [facility]  Jim Garcia MD (Nephrology)  Nani Mao MD (Rheumatology Internal Medicine)    History Patient Active Problem List   Diagnosis Code    Abdominal aortic aneurysm (AAA) ([facility]) I71.4    Asthmatic bronchitis J45.909    Azotemia R79.89    Essential hypertension I10    Benign paroxysmal positional vertigo H81.10    Contact dermatitis L25.9    Degenerative joint disease involving multiple joints M15.9    Edema R60.9    Fibromyalgia M79.7    Fibromyositis M79.7    Hyperlipidemia E78.5    Idiopathic peripheral neuropathy G60.9    Insomnia G47.00    Irritable bowel syndrome K58.9    Low back pain M54.50    Reactive depression F32.9    Tobacco dependence syndrome F17.200    URI (upper respiratory infection) J06.9    CKD (chronic kidney disease) stage 4, GFR 15-29 ml/min ([facility]) N18.4    Chronic renal disease, stage III N18.30    Malignant hypertensive heart disease without heart failure I11.9    Chronic constipation K59.09    Tobacco abuse counseling Z71.6    ESRD on dialysis ([facility] [address] N18.6, Z99.2    Hyperparathyroidism, secondary renal ([facility] [address] N25.81    Medicare annual wellness visit, subsequent Z00.00    Quit smoking Z87.891     Past Medical History:   Diagnosis Date    Abdominal aortic aneurysm (AAA) ([facility] [address] 9/23/2020    Asthmatic bronchitis 9/23/2020    Azotemia 9/23/2020    Benign paroxysmal positional vertigo 9/23/2020    Contact dermatitis 9/23/2020    Degenerative joint disease involving multiple joints 9/23/2020    Edema 9/23/2020    Essential hypertension 9/23/2020    Fibromyalgia 9/23/2020    Fibromyositis 9/23/2020    Hyperlipidemia 9/23/2020    Idiopathic peripheral neuropathy 9/23/2020    Insomnia 9/23/2020    Irritable bowel syndrome 9/23/2020    Low back pain 9/23/2020    Reactive depression 9/23/2020    Tobacco dependence syndrome 9/23/2020    URI (upper respiratory infection) 9/23/2020      Past Surgical History:   Procedure Laterality Date    HX CHOLECYSTECTOMY  1996    HX HYSTERECTOMY  1995    HX KNEE REPLACEMENT  1980    HX ORTHOPAEDIC Bilateral 2000    carpal tunnel release    HX ORTHOPAEDIC 1996    Right thumb surgery    HX PARATHYROIDECTOMY  1985     Current Outpatient Medications   Medication Sig Dispense Refill    sertraline (ZOLOFT) 25 mg tablet Take 1 Tablet by mouth daily for 30 days. Indications: major depressive disorder 30 Tablet 2    carvediloL (COREG) 12.5 mg tablet take 1 tablet by mouth twice a day 180 Tablet 0    cloNIDine (CATAPRES) 0.2 mg/24 hr ptwk APPLY 1 PATCH TO SKIN EVERY 7 DAYS 12 Patch 0    aspirin delayed-release 81 mg tablet Take 81 mg by mouth daily. clobetasoL (TEMOVATE) 0.05 % ointment Apply  to affected area two (2) times a day. pravastatin (PRAVACHOL) 40 mg tablet Take 1 Tablet by mouth nightly. Indications: high cholesterol 90 Tablet 1    acetaminophen-codeine (TYLENOL #3) 300-30 mg per tablet Take 1 Tab by mouth as needed. gabapentin (NEURONTIN) 100 mg capsule Take 2 Caps by mouth three (3) times daily. methocarbamoL (ROBAXIN) 750 mg tablet Take 2 Tabs by mouth as needed. NIFEdipine ER (ADALAT CC) 60 mg ER tablet Take 1 Tab by mouth two (2) times a day. hydrALAZINE (APRESOLINE) 50 mg tablet take 1 tablet by mouth three times a day 270 Tab 1    lidocaine (LIDODERM) 5 % as needed. (Patient not taking: No sig reported)      dextromethorphan-guaiFENesin (Robitussin Cough-Chest Sunday DM) 5-100 mg/5 mL liqd Take 5 mL by mouth four (4) times daily as needed for Cough or Congestion.  (Patient not taking: No sig reported) 120 mL 0    DULoxetine 40 mg cpDR take 1 capsule by mouth once daily (Patient not taking: No sig reported) 90 Cap 1     Allergies   Allergen Reactions    Dexamethasone Other (comments)     Edema         Family History   Problem Relation Age of Onset    Hypertension Mother     Stroke Mother     Heart Disease Mother     Hypertension Father     Stroke Father      Social History     Tobacco Use    Smoking status: Every Day     Packs/day: 0.50     Years: 18.00     Pack years: 9.00     Types: Cigarettes    Smokeless tobacco: Never Substance Use Topics    Alcohol use: Never         Caio Velazquez MD

## 2022-08-30 NOTE — PROGRESS NOTES
1. \"Have you been to the ER, urgent care clinic since your last visit? Hospitalized since your last visit? \" Yes When: June 2022 Where: [facility] ER Reason for visit: Feeling bad    2. \"Have you seen or consulted any other health care providers outside of the [address] [place] [address] since your last visit? \" No     3. For patients aged 39-70: Has the patient had a colonoscopy / FIT/ Cologuard? No      If the patient is female:    4. For patients aged 41-77: Has the patient had a mammogram within the past 2 years? No      5. For patients aged 21-65: Has the patient had a pap smear? NA - based on age or sex    Chief Complaint   Patient presents with    Welcome To Medicare    Annual Wellness Visit      Visit Vitals  /68 (BP 1 Location: Left arm, BP Patient Position: Sitting, BP Cuff Size: Adult)   Pulse 69   Temp 98.1 °F (36.7 °C) (Oral)   Resp 16   Ht 5' 2\" (1.575 m)   Wt 113 lb (51.3 kg)   SpO2 98%   BMI 20.67 kg/m²        Patient is here for a medicare wellness.

## 2022-09-14 RX ORDER — DOCUSATE SODIUM 100 MG/1
CAPSULE, LIQUID FILLED ORAL
Qty: 60 CAPSULE | Refills: 2 | Status: SHIPPED | OUTPATIENT
Start: 2022-09-14

## 2022-09-22 ENCOUNTER — TELEPHONE (OUTPATIENT)
Dept: INTERNAL MEDICINE CLINIC | Age: 72
End: 2022-09-22

## 2022-09-22 NOTE — TELEPHONE ENCOUNTER
----- Message from [address] [facility] sent at 9/21/2022  3:24 PM EDT -----  Subject: Appointment Request    Reason for Call: New Patient/New to Provider Appointment needed: New   Patient Request Appointment    QUESTIONS    Reason for appointment request? No appointments available during search     Additional Information for Provider? Pt was a pt of Dr. Tamie Pendleton and would   like to come back to office and see Dr. Fernando Turner. Her  Basia   would also like to come back to practice.  Please call Larissashari Moreno and let her   know if this is possbile.  ---------------------------------------------------------------------------  --------------  Melita SHAH  [phone]; OK to leave message on voicemail  ---------------------------------------------------------------------------  --------------  SCRIPT ANSWERS  COVID Screen: Adeel Funk

## 2022-09-29 PROBLEM — Z00.00 MEDICARE ANNUAL WELLNESS VISIT, SUBSEQUENT: Status: RESOLVED | Noted: 2022-08-30 | Resolved: 2022-09-29

## 2022-10-04 ENCOUNTER — TELEPHONE (OUTPATIENT)
Dept: INTERNAL MEDICINE CLINIC | Age: 72
End: 2022-10-04

## 2022-10-04 RX ORDER — PRAVASTATIN SODIUM 40 MG/1
40 TABLET ORAL
Qty: 90 TABLET | Refills: 1 | Status: SHIPPED | OUTPATIENT
Start: 2022-10-04

## 2022-10-04 NOTE — TELEPHONE ENCOUNTER
[address] faxed refill request for the following medication:      Pravastatin Sodium 40 MG Tab  QTY: 90  Take 1 tablet by mouth nightly for high cholesterol          LOV   8- NOV 11-

## 2022-10-18 ENCOUNTER — TELEPHONE (OUTPATIENT)
Dept: INTERNAL MEDICINE CLINIC | Age: 72
End: 2022-10-18

## 2022-10-18 NOTE — TELEPHONE ENCOUNTER
[facility] faxed refill request for the following medication:      Hydralazine 50 MG Tablet  QTY: 270  Refill: 1  Take 1 tablet by mouth three times a day        LOV 8-  NOV   2-3-2023

## 2022-10-25 RX ORDER — HYDRALAZINE HYDROCHLORIDE 50 MG/1
50 TABLET, FILM COATED ORAL 3 TIMES DAILY
Qty: 270 TABLET | Refills: 0 | Status: SHIPPED | OUTPATIENT
Start: 2022-10-25 | End: 2023-01-23

## 2022-11-02 RX ORDER — CLONIDINE 0.2 MG/24H
PATCH, EXTENDED RELEASE TRANSDERMAL
Qty: 12 PATCH | Refills: 0 | Status: SHIPPED | OUTPATIENT
Start: 2022-11-02

## 2022-11-04 RX ORDER — CARVEDILOL 12.5 MG/1
TABLET ORAL
Qty: 180 TABLET | Refills: 0 | Status: SHIPPED | OUTPATIENT
Start: 2022-11-04

## 2022-11-30 ENCOUNTER — OFFICE VISIT (OUTPATIENT)
Dept: FAMILY MEDICINE CLINIC | Age: 72
End: 2022-11-30
Payer: MEDICARE

## 2022-11-30 VITALS
SYSTOLIC BLOOD PRESSURE: 205 MMHG | HEART RATE: 65 BPM | DIASTOLIC BLOOD PRESSURE: 105 MMHG | WEIGHT: 106.7 LBS | OXYGEN SATURATION: 100 % | TEMPERATURE: 97.9 F | RESPIRATION RATE: 17 BRPM | BODY MASS INDEX: 19.64 KG/M2 | HEIGHT: 62 IN

## 2022-11-30 DIAGNOSIS — N18.6 ESRD ON DIALYSIS (HCC): ICD-10-CM

## 2022-11-30 DIAGNOSIS — G60.9 IDIOPATHIC PERIPHERAL NEUROPATHY: ICD-10-CM

## 2022-11-30 DIAGNOSIS — R11.2 NAUSEA AND VOMITING, UNSPECIFIED VOMITING TYPE: ICD-10-CM

## 2022-11-30 DIAGNOSIS — R05.3 PERSISTENT COUGH: ICD-10-CM

## 2022-11-30 DIAGNOSIS — Z99.2 ESRD ON DIALYSIS (HCC): ICD-10-CM

## 2022-11-30 DIAGNOSIS — I11.9 MALIGNANT HYPERTENSIVE HEART DISEASE WITHOUT HEART FAILURE: Primary | ICD-10-CM

## 2022-11-30 DIAGNOSIS — I10 ELEVATED BLOOD PRESSURE READING IN OFFICE WITH DIAGNOSIS OF HYPERTENSION: ICD-10-CM

## 2022-11-30 DIAGNOSIS — E78.2 MIXED HYPERLIPIDEMIA: ICD-10-CM

## 2022-11-30 DIAGNOSIS — Z87.891 QUIT SMOKING: ICD-10-CM

## 2022-11-30 DIAGNOSIS — N18.4 CKD (CHRONIC KIDNEY DISEASE) STAGE 4, GFR 15-29 ML/MIN (HCC): ICD-10-CM

## 2022-11-30 PROCEDURE — G8427 DOCREV CUR MEDS BY ELIG CLIN: HCPCS | Performed by: FAMILY MEDICINE

## 2022-11-30 PROCEDURE — G9717 DOC PT DX DEP/BP F/U NT REQ: HCPCS | Performed by: FAMILY MEDICINE

## 2022-11-30 PROCEDURE — G8536 NO DOC ELDER MAL SCRN: HCPCS | Performed by: FAMILY MEDICINE

## 2022-11-30 PROCEDURE — G8420 CALC BMI NORM PARAMETERS: HCPCS | Performed by: FAMILY MEDICINE

## 2022-11-30 PROCEDURE — 1123F ACP DISCUSS/DSCN MKR DOCD: CPT | Performed by: FAMILY MEDICINE

## 2022-11-30 PROCEDURE — 1101F PT FALLS ASSESS-DOCD LE1/YR: CPT | Performed by: FAMILY MEDICINE

## 2022-11-30 PROCEDURE — 1090F PRES/ABSN URINE INCON ASSESS: CPT | Performed by: FAMILY MEDICINE

## 2022-11-30 PROCEDURE — G9231 DOC ESRD DIA TRANS PREG: HCPCS | Performed by: FAMILY MEDICINE

## 2022-11-30 PROCEDURE — 3017F COLORECTAL CA SCREEN DOC REV: CPT | Performed by: FAMILY MEDICINE

## 2022-11-30 PROCEDURE — G8400 PT W/DXA NO RESULTS DOC: HCPCS | Performed by: FAMILY MEDICINE

## 2022-11-30 PROCEDURE — 99214 OFFICE O/P EST MOD 30 MIN: CPT | Performed by: FAMILY MEDICINE

## 2022-11-30 PROCEDURE — 3078F DIAST BP <80 MM HG: CPT | Performed by: FAMILY MEDICINE

## 2022-11-30 PROCEDURE — 3074F SYST BP LT 130 MM HG: CPT | Performed by: FAMILY MEDICINE

## 2022-11-30 RX ORDER — BENZONATATE 200 MG/1
200 CAPSULE ORAL
Qty: 21 CAPSULE | Refills: 0 | Status: SHIPPED | OUTPATIENT
Start: 2022-11-30 | End: 2022-12-07

## 2022-11-30 RX ORDER — ONDANSETRON 4 MG/1
4 TABLET, ORALLY DISINTEGRATING ORAL
Qty: 21 TABLET | Refills: 0 | Status: SHIPPED | OUTPATIENT
Start: 2022-11-30 | End: 2022-12-07

## 2022-11-30 RX ORDER — LOSARTAN POTASSIUM 25 MG/1
25 TABLET ORAL DAILY
COMMUNITY
Start: 2022-11-19

## 2022-11-30 NOTE — PROGRESS NOTES
Mary Anne Hardin is a 80 y.o. female and presents with Follow Up Chronic Condition and Cold Symptoms (Pt states she is still coughing up yellow mucus it has gotten worse and would like some medication. )  . HPI     81 Yo with a hx of HTN,and ESRD with hyperlipidemia c/o nausea and vomiting with 6 episodes yesterday and 2 this morning    Subjective:  Cardiovascular Review:  The patient has hypertension   Diet and Lifestyle: generally follows a low fat low cholesterol diet, generally follows a low sodium diet, exercises sporadically  Home BP Monitoring: is not measured at home. Pertinent ROS: taking medications as instructed, no medication side effects noted, no TIA's, no chest pain on exertion, no dyspnea on exertion, no swelling of ankles. Review of Systems  Review of Systems   Constitutional: Negative. Negative for chills and fever. HENT: Negative. Negative for congestion, ear discharge, hearing loss, nosebleeds and tinnitus. Eyes: Negative. Negative for blurred vision, double vision, photophobia and pain. Respiratory:  Positive for cough and sputum production. Negative for hemoptysis. Cardiovascular: Negative. Negative for chest pain and palpitations. Gastrointestinal: Negative. Negative for heartburn, nausea and vomiting. Genitourinary: Negative. Negative for dysuria, frequency and urgency. Musculoskeletal: Negative. Negative for back pain and myalgias. Skin: Negative. Neurological: Negative. Negative for dizziness, tingling, weakness and headaches. Endo/Heme/Allergies: Negative. Psychiatric/Behavioral: Negative. Negative for depression and suicidal ideas. The patient does not have insomnia. All other systems reviewed and are negative.       Past Medical History:   Diagnosis Date    Abdominal aortic aneurysm (AAA) 9/23/2020    Asthmatic bronchitis 9/23/2020    Azotemia 9/23/2020    Benign paroxysmal positional vertigo 9/23/2020    Contact dermatitis 9/23/2020 Degenerative joint disease involving multiple joints 9/23/2020    Edema 9/23/2020    Essential hypertension 9/23/2020    Fibromyalgia 9/23/2020    Fibromyositis 9/23/2020    Hyperlipidemia 9/23/2020    Idiopathic peripheral neuropathy 9/23/2020    Insomnia 9/23/2020    Irritable bowel syndrome 9/23/2020    Low back pain 9/23/2020    Reactive depression 9/23/2020    Tobacco dependence syndrome 9/23/2020    URI (upper respiratory infection) 9/23/2020     Past Surgical History:   Procedure Laterality Date    HX CHOLECYSTECTOMY  1996    HX HYSTERECTOMY  1995    HX KNEE REPLACEMENT  1980    HX ORTHOPAEDIC Bilateral 2000    carpal tunnel release    HX ORTHOPAEDIC  1996    Right thumb surgery    HX PARATHYROIDECTOMY  1985     Social History     Socioeconomic History    Marital status:    Tobacco Use    Smoking status: Every Day     Packs/day: 0.50     Years: 18.00     Pack years: 9.00     Types: Cigarettes    Smokeless tobacco: Never   Vaping Use    Vaping Use: Never used   Substance and Sexual Activity    Alcohol use: Never    Drug use: Never     Social Determinants of Health     Financial Resource Strain: Low Risk     Difficulty of Paying Living Expenses: Not hard at all   Food Insecurity: No Food Insecurity    Worried About Running Out of Food in the Last Year: Never true    Ran Out of Food in the Last Year: Never true   Transportation Needs: No Transportation Needs    Lack of Transportation (Medical): No    Lack of Transportation (Non-Medical): No   Physical Activity: Unknown    Minutes of Exercise per Session: 0 min   Social Connections: Moderately Integrated    Frequency of Communication with Friends and Family: Three times a week    Frequency of Social Gatherings with Friends and Family:  Three times a week    Attends Christian Services: More than 4 times per year    Active Member of Clubs or Organizations: No    Attends Club or Organization Meetings: Never    Marital Status:    Housing Stability: Low Risk     Unable to Pay for Housing in the Last Year: No    Number of Places Lived in the Last Year: 1    Unstable Housing in the Last Year: No     Family History   Problem Relation Age of Onset    Hypertension Mother     Stroke Mother     Heart Disease Mother     Hypertension Father     Stroke Father      Current Outpatient Medications   Medication Sig Dispense Refill    losartan (COZAAR) 25 mg tablet Take 25 mg by mouth daily. carvediloL (COREG) 12.5 mg tablet take 1 tablet by mouth twice a day 180 Tablet 0    cloNIDine (CATAPRES) 0.2 mg/24 hr ptwk APPLY 1 PATCH TO SKIN EVERY 7 DAYS 12 Patch 0    hydrALAZINE (APRESOLINE) 50 mg tablet Take 1 Tablet by mouth three (3) times daily for 90 days. 270 Tablet 0    pravastatin (PRAVACHOL) 40 mg tablet Take 1 Tablet by mouth nightly. Indications: high cholesterol 90 Tablet 1    Col-Rite 100 mg capsule take 1 capsule by mouth twice a day for 90 DAYS 60 Capsule 2    aspirin delayed-release 81 mg tablet Take 81 mg by mouth daily. clobetasoL (TEMOVATE) 0.05 % ointment Apply  to affected area two (2) times a day. acetaminophen-codeine (TYLENOL #3) 300-30 mg per tablet Take 1 Tab by mouth as needed. gabapentin (NEURONTIN) 100 mg capsule Take 2 Caps by mouth three (3) times daily. methocarbamoL (ROBAXIN) 750 mg tablet Take 2 Tabs by mouth as needed. NIFEdipine ER (ADALAT CC) 60 mg ER tablet Take 1 Tab by mouth two (2) times a day. lidocaine (LIDODERM) 5 % as needed. (Patient not taking: No sig reported)      dextromethorphan-guaiFENesin (Robitussin Cough-Chest Sunday DM) 5-100 mg/5 mL liqd Take 5 mL by mouth four (4) times daily as needed for Cough or Congestion.  (Patient not taking: No sig reported) 120 mL 0    DULoxetine 40 mg cpDR take 1 capsule by mouth once daily (Patient not taking: No sig reported) 90 Cap 1     Allergies   Allergen Reactions    Dexamethasone Other (comments)     Edema         Objective:  Visit Vitals  BP (!) 205/105 (BP 1 Location: Right upper arm, BP Patient Position: Sitting, BP Cuff Size: Adult)   Pulse 65   Temp 97.9 °F (36.6 °C) (Oral)   Resp 17   Ht 5' 2\" (1.575 m)   Wt 106 lb 11.2 oz (48.4 kg)   SpO2 100%   BMI 19.52 kg/m²       Physical Exam:   Physical Exam        Results for orders placed or performed in visit on 03/16/56   METABOLIC PANEL, COMPREHENSIVE   Result Value Ref Range    Glucose 80 65 - 99 mg/dL    BUN 20 8 - 27 mg/dL    Creatinine 6.25 (H) 0.57 - 1.00 mg/dL    eGFR 7 (L) >59 mL/min/1.73    BUN/Creatinine ratio 3 (L) 12 - 28    Sodium 146 (H) 134 - 144 mmol/L    Potassium 3.7 3.5 - 5.2 mmol/L    Chloride 97 96 - 106 mmol/L    CO2 24 20 - 29 mmol/L    Calcium 9.4 8.7 - 10.3 mg/dL    Protein, total 6.4 6.0 - 8.5 g/dL    Albumin 4.1 3.8 - 4.8 g/dL    GLOBULIN, TOTAL 2.3 1.5 - 4.5 g/dL    A-G Ratio 1.8 1.2 - 2.2    Bilirubin, total 0.2 0.0 - 1.2 mg/dL    Alk. phosphatase 65 44 - 121 IU/L    AST (SGOT) 15 0 - 40 IU/L    ALT (SGPT) 8 0 - 32 IU/L   LIPID PANEL   Result Value Ref Range    Cholesterol, total 206 (H) 100 - 199 mg/dL    Triglyceride 116 0 - 149 mg/dL    HDL Cholesterol 71 >39 mg/dL    VLDL, calculated 20 5 - 40 mg/dL    LDL, calculated 115 (H) 0 - 99 mg/dL       Assessment/Plan:    ICD-10-CM ICD-9-CM    1. Malignant hypertensive heart disease without heart failure  I11.9 402.00       2. Quit smoking  Z87. 891 V15.82       3. CKD (chronic kidney disease) stage 4, GFR 15-29 ml/min ([facility])  N18.4 585.4       4. [facility]  N18.6 585.6     Z99.2 V45.11       5. Nausea and vomiting, unspecified vomiting type  R11.2 787.01       6. Elevated blood pressure reading in office with diagnosis of hypertension  I10 401.9       7. Idiopathic peripheral neuropathy  G60.9 356.9       8. Mixed hyperlipidemia  E78.2 272.2       9. Persistent cough  R05.3 786.2 XR CHEST PA LAT        Orders Placed This Encounter    losartan (COZAAR) 25 mg tablet     Sig: Take 25 mg by mouth daily.      Cannot display discharge medications since this is not an admission.

## 2022-11-30 NOTE — PROGRESS NOTES
1. \"Have you been to the ER, urgent care clinic since your last visit? Hospitalized since your last visit? \" No    2. \"Have you seen or consulted any other health care providers outside of the [address] [place] [address] since your last visit? \" No     3. For patients aged 39-70: Has the patient had a colonoscopy / FIT/ Cologuard? Yes - no Care Gap present      If the patient is female:    4. For patients aged 41-77: Has the patient had a mammogram within the past 2 years? No      5. For patients aged 21-65: Has the patient had a pap smear? NA - based on age or sex     Chief Complaint   Patient presents with    Follow Up Chronic Condition    Cold Symptoms     Pt states she is still coughing up yellow mucus it has gotten worse and would like some medication. Visit Vitals  BP (!) 205/105 (BP 1 Location: Right upper arm, BP Patient Position: Sitting, BP Cuff Size: Adult)   Pulse 65   Temp 97.9 °F (36.6 °C) (Oral)   Resp 17   Ht 5' 2\" (1.575 m)   Wt 106 lb 11.2 oz (48.4 kg)   SpO2 100%   BMI 19.52 kg/m²     Pt is here for a follow up and coughing up yellow mucus she said its getting worse from sitting under the cold air at dialysis pt would like some meds for the cough. Pt BP was elevated both times I checked.

## 2022-12-12 ENCOUNTER — HOSPITAL ENCOUNTER (EMERGENCY)
Age: 72
Discharge: ANOTHER HOSPITAL | End: 2022-12-12
Attending: STUDENT IN AN ORGANIZED HEALTH CARE EDUCATION/TRAINING PROGRAM
Payer: MEDICARE

## 2022-12-12 ENCOUNTER — APPOINTMENT (OUTPATIENT)
Dept: GENERAL RADIOLOGY | Age: 72
End: 2022-12-12
Attending: STUDENT IN AN ORGANIZED HEALTH CARE EDUCATION/TRAINING PROGRAM
Payer: MEDICARE

## 2022-12-12 ENCOUNTER — APPOINTMENT (OUTPATIENT)
Dept: CT IMAGING | Age: 72
End: 2022-12-12
Attending: STUDENT IN AN ORGANIZED HEALTH CARE EDUCATION/TRAINING PROGRAM
Payer: MEDICARE

## 2022-12-12 VITALS
DIASTOLIC BLOOD PRESSURE: 76 MMHG | RESPIRATION RATE: 12 BRPM | HEART RATE: 58 BPM | OXYGEN SATURATION: 98 % | SYSTOLIC BLOOD PRESSURE: 109 MMHG | WEIGHT: 108 LBS | HEIGHT: 62 IN | BODY MASS INDEX: 19.88 KG/M2 | TEMPERATURE: 97.5 F

## 2022-12-12 DIAGNOSIS — D64.9 ANEMIA, UNSPECIFIED TYPE: ICD-10-CM

## 2022-12-12 DIAGNOSIS — R57.8 HEMORRHAGIC SHOCK (HCC): Primary | ICD-10-CM

## 2022-12-12 LAB
ANION GAP SERPL CALC-SCNC: 11 MMOL/L (ref 5–15)
APTT PPP: 29.7 SEC (ref 21.2–34.1)
BASOPHILS # BLD: 0 K/UL (ref 0–0.1)
BASOPHILS NFR BLD: 1 % (ref 0–1)
BUN SERPL-MCNC: 30 MG/DL (ref 6–20)
BUN/CREAT SERPL: 5 (ref 12–20)
CA-I BLD-MCNC: 9.2 MG/DL (ref 8.5–10.1)
CHLORIDE SERPL-SCNC: 107 MMOL/L (ref 97–108)
CO2 SERPL-SCNC: 24 MMOL/L (ref 21–32)
CREAT SERPL-MCNC: 5.9 MG/DL (ref 0.55–1.02)
DIFFERENTIAL METHOD BLD: ABNORMAL
EOSINOPHIL # BLD: 0.2 K/UL (ref 0–0.4)
EOSINOPHIL NFR BLD: 3 % (ref 0–7)
ERYTHROCYTE [DISTWIDTH] IN BLOOD BY AUTOMATED COUNT: 19.7 % (ref 11.5–14.5)
GLUCOSE SERPL-MCNC: 211 MG/DL (ref 65–100)
HCT VFR BLD AUTO: 24.7 % (ref 35–47)
HGB BLD-MCNC: 7.9 G/DL (ref 11.5–16)
IMM GRANULOCYTES # BLD AUTO: 0 K/UL (ref 0–0.04)
IMM GRANULOCYTES NFR BLD AUTO: 1 % (ref 0–0.5)
INR PPP: 1.3 (ref 0.9–1.1)
LYMPHOCYTES # BLD: 2 K/UL (ref 0.8–3.5)
LYMPHOCYTES NFR BLD: 33 % (ref 12–49)
MCH RBC QN AUTO: 27.3 PG (ref 26–34)
MCHC RBC AUTO-ENTMCNC: 32 G/DL (ref 30–36.5)
MCV RBC AUTO: 85.5 FL (ref 80–99)
MONOCYTES # BLD: 0.3 K/UL (ref 0–1)
MONOCYTES NFR BLD: 6 % (ref 5–13)
NEUTS SEG # BLD: 3.4 K/UL (ref 1.8–8)
NEUTS SEG NFR BLD: 56 % (ref 32–75)
NRBC # BLD: 0 K/UL (ref 0–0.01)
NRBC BLD-RTO: 0 PER 100 WBC
PLATELET # BLD AUTO: 125 K/UL (ref 150–400)
PMV BLD AUTO: 11 FL (ref 8.9–12.9)
POTASSIUM SERPL-SCNC: 4.5 MMOL/L (ref 3.5–5.1)
PROTHROMBIN TIME: 16.1 SEC (ref 11.9–14.6)
RBC # BLD AUTO: 2.89 M/UL (ref 3.8–5.2)
SODIUM SERPL-SCNC: 142 MMOL/L (ref 136–145)
THERAPEUTIC RANGE,PTTT: NORMAL SEC (ref 82–109)
WBC # BLD AUTO: 5.9 K/UL (ref 3.6–11)

## 2022-12-12 PROCEDURE — 85025 COMPLETE CBC W/AUTO DIFF WBC: CPT

## 2022-12-12 PROCEDURE — P9016 RBC LEUKOCYTES REDUCED: HCPCS

## 2022-12-12 PROCEDURE — 36415 COLL VENOUS BLD VENIPUNCTURE: CPT

## 2022-12-12 PROCEDURE — 74176 CT ABD & PELVIS W/O CONTRAST: CPT

## 2022-12-12 PROCEDURE — 99285 EMERGENCY DEPT VISIT HI MDM: CPT

## 2022-12-12 PROCEDURE — 80048 BASIC METABOLIC PNL TOTAL CA: CPT

## 2022-12-12 PROCEDURE — 85730 THROMBOPLASTIN TIME PARTIAL: CPT

## 2022-12-12 PROCEDURE — 85610 PROTHROMBIN TIME: CPT

## 2022-12-12 PROCEDURE — 74011250636 HC RX REV CODE- 250/636: Performed by: STUDENT IN AN ORGANIZED HEALTH CARE EDUCATION/TRAINING PROGRAM

## 2022-12-12 PROCEDURE — 86920 COMPATIBILITY TEST SPIN: CPT

## 2022-12-12 PROCEDURE — 36430 TRANSFUSION BLD/BLD COMPNT: CPT

## 2022-12-12 PROCEDURE — 86900 BLOOD TYPING SEROLOGIC ABO: CPT

## 2022-12-12 RX ORDER — ONDANSETRON 2 MG/ML
4 INJECTION INTRAMUSCULAR; INTRAVENOUS
Status: DISCONTINUED | OUTPATIENT
Start: 2022-12-12 | End: 2022-12-12 | Stop reason: HOSPADM

## 2022-12-12 RX ORDER — FENTANYL CITRATE 50 UG/ML
50 INJECTION, SOLUTION INTRAMUSCULAR; INTRAVENOUS
Status: DISCONTINUED | OUTPATIENT
Start: 2022-12-12 | End: 2022-12-12 | Stop reason: HOSPADM

## 2022-12-12 RX ADMIN — SODIUM CHLORIDE 500 ML: 9 INJECTION, SOLUTION INTRAVENOUS at 19:06

## 2022-12-12 NOTE — ED TRIAGE NOTES
[facility] 15 pt had a stent placed in her ABD after finding that pt had a blockage in her intestine; pt was d/c and is c/o pain to the incision site; ptt has just had a AAA repair in April 2022;  Hx HD HTN fibromyalgia

## 2022-12-12 NOTE — ED PROVIDER NOTES
[id number]  EMERGENCY DEPARTMENT ENCOUNTER NOTE        Date: 12/12/2022  Patient Name: Mary Ellen Sanchez      History of Presenting Illness     Chief Complaint   Patient presents with    Abdominal Pain    Groin Pain       History Provided By: Patient    HPI: Mary Ellen Sanchez, 80 y.o. female with PMH as below including AAA status postrepair in April and superior mesenteric artery stenosis stenting this morning who was discharged from the hospital and as she went helms been having swelling in her right groin where she had the vascular access been slowly worsening. She is reporting pain at the site, worsened by movement and palpation, no additional aggravating alleviating factors associate with nausea. No vomiting, fever or chills. PCP: Samantha Blanchard MD    Current Facility-Administered Medications   Medication Dose Route Frequency Provider Last Rate Last Admin    ondansetron (ZOFRAN) injection 4 mg  4 mg IntraVENous NOW Juana Boyd MD        fentaNYL citrate (PF) injection 50 mcg  50 mcg IntraVENous NOW Juana Boyd MD         Current Outpatient Medications   Medication Sig Dispense Refill    losartan (COZAAR) 25 mg tablet Take 25 mg by mouth daily. carvediloL (COREG) 12.5 mg tablet take 1 tablet by mouth twice a day 180 Tablet 0    cloNIDine (CATAPRES) 0.2 mg/24 hr ptwk APPLY 1 PATCH TO SKIN EVERY 7 DAYS 12 Patch 0    hydrALAZINE (APRESOLINE) 50 mg tablet Take 1 Tablet by mouth three (3) times daily for 90 days. 270 Tablet 0    pravastatin (PRAVACHOL) 40 mg tablet Take 1 Tablet by mouth nightly. Indications: high cholesterol 90 Tablet 1    Col-Rite 100 mg capsule take 1 capsule by mouth twice a day for 90 DAYS 60 Capsule 2    aspirin delayed-release 81 mg tablet Take 81 mg by mouth daily. clobetasoL (TEMOVATE) 0.05 % ointment Apply  to affected area two (2) times a day.       acetaminophen-codeine (TYLENOL #3) 300-30 mg per tablet Take 1 Tab by mouth as needed. gabapentin (NEURONTIN) 100 mg capsule Take 2 Caps by mouth three (3) times daily. lidocaine (LIDODERM) 5 % as needed. (Patient not taking: No sig reported)      methocarbamoL (ROBAXIN) 750 mg tablet Take 2 Tabs by mouth as needed. NIFEdipine ER (ADALAT CC) 60 mg ER tablet Take 1 Tab by mouth two (2) times a day. dextromethorphan-guaiFENesin (Robitussin Cough-Chest Sunday DM) 5-100 mg/5 mL liqd Take 5 mL by mouth four (4) times daily as needed for Cough or Congestion.  (Patient not taking: No sig reported) 120 mL 0    DULoxetine 40 mg cpDR take 1 capsule by mouth once daily (Patient not taking: No sig reported) 90 Cap 1       Past History     Past Medical History:  Past Medical History:   Diagnosis Date    Abdominal aortic aneurysm (AAA) 9/23/2020    Asthmatic bronchitis 9/23/2020    Azotemia 9/23/2020    Benign paroxysmal positional vertigo 9/23/2020    Contact dermatitis 9/23/2020    Degenerative joint disease involving multiple joints 9/23/2020    Edema 9/23/2020    Essential hypertension 9/23/2020    Fibromyalgia 9/23/2020    Fibromyositis 9/23/2020    Hyperlipidemia 9/23/2020    Idiopathic peripheral neuropathy 9/23/2020    Insomnia 9/23/2020    Irritable bowel syndrome 9/23/2020    Low back pain 9/23/2020    Reactive depression 9/23/2020    Tobacco dependence syndrome 9/23/2020    URI (upper respiratory infection) 9/23/2020         Past Surgical History:  Past Surgical History:   Procedure Laterality Date    HX CHOLECYSTECTOMY  1996    HX HYSTERECTOMY  1995    HX KNEE REPLACEMENT  1980    HX ORTHOPAEDIC Bilateral 2000    carpal tunnel release    HX ORTHOPAEDIC  1996    Right thumb surgery    HX PARATHYROIDECTOMY  1985       Family History:  Family History   Problem Relation Age of Onset    Hypertension Mother     Stroke Mother     Heart Disease Mother     Hypertension Father     Stroke Father        Social History:  Social History     Tobacco Use    Smoking status: Every Day Packs/day: 0.50     Years: 18.00     Pack years: 9.00     Types: Cigarettes    Smokeless tobacco: Never   Vaping Use    Vaping Use: Never used   Substance Use Topics    Alcohol use: Never    Drug use: Never       Allergies: Allergies   Allergen Reactions    Dexamethasone Other (comments)     Edema           Review of Systems     Review of Systems    A 10 point review of system was performed and was negative except as noted above in HPI    Physical Exam     Physical Exam  Vitals and nursing note reviewed. Exam conducted with a chaperone present. Constitutional:       General: She is in acute distress. Appearance: She is well-developed. She is not diaphoretic. HENT:      Head: Normocephalic and atraumatic. Eyes:      Extraocular Movements: Extraocular movements intact. Conjunctiva/sclera: Conjunctivae normal.   Cardiovascular:      Rate and Rhythm: Regular rhythm. Bradycardia present. Heart sounds: Normal heart sounds. Pulmonary:      Effort: Pulmonary effort is normal.      Breath sounds: Normal breath sounds. Abdominal:      Palpations: Abdomen is soft. Tenderness: There is no abdominal tenderness. Genitourinary:     Comments: Hematoma of the groin. No palatable thrill. Musculoskeletal:      Cervical back: Neck supple. Right lower leg: No tenderness. No edema. Left lower leg: No tenderness. No edema. Neurological:      General: No focal deficit present. Mental Status: She is alert and oriented to person, place, and time.        Lab and Diagnostic Study Results     Labs -     Recent Results (from the past 12 hour(s))   CBC WITH AUTOMATED DIFF    Collection Time: 12/12/22  6:56 PM   Result Value Ref Range    WBC 5.9 3.6 - 11.0 K/uL    RBC 2.89 (L) 3.80 - 5.20 M/uL    HGB 7.9 (L) 11.5 - 16.0 g/dL    HCT 24.7 (L) 35.0 - 47.0 %    MCV 85.5 80.0 - 99.0 FL    MCH 27.3 26.0 - 34.0 PG    MCHC 32.0 30.0 - 36.5 g/dL    RDW 19.7 (H) 11.5 - 14.5 %    PLATELET 503 (L) 025 - 400 K/uL    MPV 11.0 8.9 - 12.9 FL    NRBC 0.0 0.0  WBC    ABSOLUTE NRBC 0.00 0.00 - 0.01 K/uL    NEUTROPHILS 56 32 - 75 %    LYMPHOCYTES 33 12 - 49 %    MONOCYTES 6 5 - 13 %    EOSINOPHILS 3 0 - 7 %    BASOPHILS 1 0 - 1 %    IMMATURE GRANULOCYTES 1 (H) 0 - 0.5 %    ABS. NEUTROPHILS 3.4 1.8 - 8.0 K/UL    ABS. LYMPHOCYTES 2.0 0.8 - 3.5 K/UL    ABS. MONOCYTES 0.3 0.0 - 1.0 K/UL    ABS. EOSINOPHILS 0.2 0.0 - 0.4 K/UL    ABS. BASOPHILS 0.0 0.0 - 0.1 K/UL    ABS. IMM.  GRANS. 0.0 0.00 - 0.04 K/UL    DF AUTOMATED     METABOLIC PANEL, BASIC    Collection Time: 12/12/22  6:56 PM   Result Value Ref Range    Sodium 142 136 - 145 mmol/L    Potassium 4.5 3.5 - 5.1 mmol/L    Chloride 107 97 - 108 mmol/L    CO2 24 21 - 32 mmol/L    Anion gap 11 5 - 15 mmol/L    Glucose 211 (H) 65 - 100 mg/dL    BUN 30 (H) 6 - 20 mg/dL    Creatinine 5.90 (H) 0.55 - 1.02 mg/dL    BUN/Creatinine ratio 5 (L) 12 - 20      eGFR 7 (L) >60 ml/min/1.73m2    Calcium 9.2 8.5 - 10.1 mg/dL   PROTHROMBIN TIME + INR    Collection Time: 12/12/22  6:56 PM   Result Value Ref Range    Prothrombin time 16.1 (H) 11.9 - 14.6 sec    INR 1.3 (H) 0.9 - 1.1     PTT    Collection Time: 12/12/22  6:56 PM   Result Value Ref Range    aPTT 29.7 21.2 - 34.1 sec    aPTT, therapeutic range   82 - 109 sec   EMERGENT RELEASE OF UNCROSSMATCHED RED CELLS    Collection Time: 12/12/22  6:56 PM   Result Value Ref Range    Crossmatch Expiration 12/15/2022,2359     ABO/Rh(D) A Positive     Antibody screen Negative     Unit number [id number]     Blood component type RC LR,2     Unit division 00     Status of unit Issued     UNIT TAG COMMENT Emergency Release     TRANSFUSION STATUS Ok to transfuse     Crossmatch result Compatible     Unit number [id number]     Blood component type RC LR,2     Unit division 00     Status of unit Issued     UNIT TAG COMMENT Emergency Release     TRANSFUSION STATUS Ok to transfuse     Crossmatch result Compatible     Unit number [id number]     Blood component type RC LR     Unit division 00     Status of unit Issued     UNIT TAG COMMENT Emergency Release     TRANSFUSION STATUS Ok to transfuse     Crossmatch result Compatible        Radiologic Studies -   [unfilled]  CT Results  (Last 48 hours)                 12/12/22 1838  CT ABD PELV WO CONT Final result    Impression:  There is a large soft tissue hematoma measuring at least 15 x 9 x 13 cm in size   centered at the upper thigh extending into the inguinal region and anterior   abdominal wall and inferiorly to the mid thigh on the right, etiology is   indeterminate. Correlate for recent right femoral artery catheterization. Consider CTA for further delineation as clinically warranted       Fecal stasis is severe. Status post aortobiiliac stent graft placement. Post cholecystectomy. Incidental and/or nonemergent findings are as described in detail above. Narrative:  CLINICAL HISTORY: Lower abdominal Pain    INDICATION: Lower abdominal Pain   COMPARISON: 12/8/2021   CONTRAST:  None. TECHNIQUE:    Thin axial images were obtained through the abdomen and pelvis. Coronal and   sagittal reformats were generated. Oral contrast was not administered. CT dose   reduction was achieved through use of a standardized protocol tailored for this   examination and automatic exposure control for dose modulation. The absence of intravenous contrast material reduces the sensitivity for   evaluation of visceral organs and vasculature including presence of small mass   lesions, hemodynamically significant stenoses, dissections, mucosal   abnormalities etc.       FINDINGS:    LOWER THORAX: Trace pericardial effusion. Coronary vascular calcifications. LIVER/GALLBLADDER: No mass. Cholecystectomy CBD is not dilated. SPLEEN/PANCREAS:  within normal limits. ADRENALS/KIDNEYS: Unremarkable. Left greater than right renal atrophy.  Left   renal hypodensities consistent with cyst requires no imaging follow-up. Bilateral renal calculi are nonobstructive. STOMACH: Unremarkable. SMALL BOWEL/COLON: Fecal stasis. No dilatation or wall thickening. Colonic   diverticulosis. APPENDIX: Unremarkable. PERITONEUM: No ascites or pneumoperitoneum. RETROPERITONEUM: Abdominal aortic aneurysm status post aortobiiliac stent graft   placement   REPRODUCTIVE ORGANS: Hysterectomy. URINARY BLADDER: The bladder is nondistended. There is contrast material in the   urinary bladder. BONES: Minimal anterolisthesis of L4 on L5. Multilevel canal and foraminal   stenoses. No acute fractures identified. ADDITIONAL COMMENTS:        There is a large soft tissue hematoma measuring at least 15 x 9 x 13 cm in size   centered at the upper thigh extending into the inguinal region and anterior   abdominal wall and inferiorly to the mid thigh on the right, etiology is   indeterminate. CXR Results  (Last 48 hours)      None            Medical Decision Making and ED Course   - I am the first and primary provider for this patient AND AM THE PRIMARY PROVIDER OF RECORD. - I reviewed the vital signs, available nursing notes, past medical history, past surgical history, family history and social history. - Initial assessment performed. The patients presenting problems have been discussed, and the staff are in agreement with the care plan formulated and outlined with them. I have encouraged them to ask questions as they arise throughout their visit. Vital Signs-Reviewed the patient's vital signs.     Patient Vitals for the past 24 hrs:   Temp Pulse Resp BP SpO2   12/12/22 2041 -- (!) 58 -- 109/76 98 %   12/12/22 2029 -- (!) 54 -- 117/85 --   12/12/22 2026 -- (!) 58 -- 103/62 --   12/12/22 2024 -- (!) 56 -- (!) 88/76 --   12/12/22 2022 -- (!) 56 -- (!) 55/40 97 %   12/12/22 2014 -- 61 -- 92/82 100 %   12/12/22 2012 -- (!) 55 -- (!) 75/45 --   12/12/22 2010 -- (!) 51 -- 100/73 --   12/12/22 2008 -- (!) 56 -- (!) 118/57 --   12/12/22 2006 -- (!) 54 -- 115/67 --   12/12/22 2004 -- (!) 53 -- 125/76 --   12/12/22 2002 -- (!) 53 -- 136/75 100 %   12/12/22 2001 -- (!) 53 -- (!) 142/81 100 %   12/12/22 2000 -- -- -- 135/79 --   12/12/22 1956 -- (!) 54 -- 128/78 100 %   12/12/22 1954 -- (!) 55 -- (!) 114/91 --   12/12/22 1952 -- 60 -- 110/63 --   12/12/22 1950 -- 60 -- (!) 153/110 --   12/12/22 1947 -- (!) 57 -- 139/66 --   12/12/22 1942 -- 83 -- 126/71 --   12/12/22 1937 -- 82 -- 113/66 --   12/12/22 1934 -- (!) 55 -- (!) 93/57 --   12/12/22 1932 -- 87 -- (!) 103/59 --   12/12/22 1930 -- (!) 55 -- (!) 90/57 --   12/12/22 1928 -- (!) 57 -- (!) 81/50 --   12/12/22 1926 -- (!) 51 -- (!) 66/29 --   12/12/22 1925 -- (!) 55 -- (!) 77/40 --   12/12/22 1924 -- (!) 54 -- (!) 72/52 --   12/12/22 1923 -- (!) 51 -- (!) 65/29 --   12/12/22 1922 -- (!) 55 -- (!) 47/37 --   12/12/22 1919 -- (!) 56 -- (!) 81/71 --   12/12/22 1914 -- (!) 53 -- (!) 73/56 --   12/12/22 1845 97.5 °F (36.4 °C) (!) 52 12 (!) 85/60 100 %       Records Reviewed: Nursing Notes    Provider Notes (Medical Decision Making):     Patient is 71-year-old female with past medical history as above including SMA stent that was placed today. She is coming to the ED with expander hematoma at the groin where she had the vascular access for the procedure. Initially upon arrival to the ED she was hemodynamically stable. However, as she is being frequently evaluated with no the patient is becoming hypotensive. She however was not tachycardic at all. Once undressed, it was noted that the patient had 2 clonidine patches on her that was removed and cleaned. Patient is responsive to blood products and IV fluids. She did receive 3 units of emergency release blood. She also did receive 500 cc of normal saline. She had several episodes of hypotension responsive to volume resuscitation. I did consult the vascular surgeon on-call Dr. Alisa Jenkins who did the procedure today at [facility].   During her instability he recommended that compression at the site where she had the puncture wound stabilization, and transfer to [address] [place] [address] for further evaluation work-up. Unfortunately, due to her multiple comorbidities, several of the peripheral vascular access is obtained blown. She has a 20-gauge in her right forearm and 18-gauge in her left EJ. I attempted placing a central line. However, I did note that the patient has calcification preventing threading of the guidewire. Proper placement was confirmed with ultrasound. However, there is a stent would be attributed to the issue of calcification. By the time the procedure was being performed, EMS staff arrived. Thus, in order not to delay her definitive care patient was transferred to [facility]. While she is here in the ED, she had imaging done which showed an expanding hematoma exactly at the surgical site which correlates with her procedure today. Additionally noted that she has anemia on blood work. After stabilization, were able to arrange for expedited transfer via ALS. ALS was favored over flight transfer due to the fact that would need EMS personnel to apply direct compression to the surgical site where she has the puncture today. There are no other complaint or new physical findings at this time. Procedures and Critical Care       Performed by: Tommie Trevino MD  PROCEDURES:  Critical Care  Performed by: Royal Khalil MD  Authorized by: Royal Khalil MD     Critical care provider statement:     Critical care time (minutes):  80    Critical care time was exclusive of:  Separately billable procedures and treating other patients    Critical care was necessary to treat or prevent imminent or life-threatening deterioration of the following conditions: Hemorrhagic shock.     Critical care was time spent personally by me on the following activities:  Development of treatment plan with patient or surrogate, discussions with consultants, evaluation of patient's response to treatment, examination of patient, obtaining history from patient or surrogate, ordering and performing treatments and interventions, ordering and review of laboratory studies, ordering and review of radiographic studies, pulse oximetry, re-evaluation of patient's condition, review of old charts and vascular access procedures    I assumed direction of critical care for this patient from another provider in my specialty: no      Care discussed with: accepting provider at another facility         Diagnosis     Clinical Impression:   1. Hemorrhagic shock ([address]    2. Anemia, unspecified type          Disposition     Disposition: Condition ongoing  Transferred to [facility] patient verbally agreed to transfer and understand the risks involved as outlined in the EMTALA form. Transferred to Another Facility      Attestations: Simba Badillo MD    Please note that this dictation was completed with [employer], the computer voice recognition software. Quite often unanticipated grammatical, syntax, homophones, and other interpretive errors are inadvertently transcribed by the computer software. Please disregard these errors. Please excuse any errors that have escaped final proofreading. Thank you.

## 2022-12-13 LAB
ABO + RH BLD: NORMAL
BLD PROD TYP BPU: NORMAL
BLOOD GROUP ANTIBODIES SERPL: NEGATIVE
BPU ID: NORMAL
CROSSMATCH RESULT,%XM: NORMAL
SPECIMEN EXP DATE BLD: NORMAL
STATUS OF UNIT,%ST: NORMAL
TRANSFUSION STATUS PATIENT QL: NORMAL
UNIT DIVISION, %UDIV: 0
UNIT TAG COMMENT,%CM: NORMAL

## 2022-12-13 NOTE — ED NOTES
TRANSFER - OUT REPORT:    Verbal report given to Rony Cárdenas RN(name) on Mary Ellen Sanchez  being transferred to [facility] ED(unit) for urgent transfer       Report consisted of patients Situation, Background, Assessment and   Recommendations(SBAR). Information from the following report(s) SBAR, ED Summary, MAR, and Recent Results was reviewed with the receiving nurse. Lines:   Peripheral IV 12/12/22 Right Antecubital (Active)   Site Assessment Clean, dry, & intact 12/12/22 1859   Phlebitis Assessment 0 12/12/22 1859   Infiltration Assessment 0 12/12/22 1859   Dressing Status Clean, dry, & intact 12/12/22 1859   Dressing Type Tape;Transparent 12/12/22 1859   Hub Color/Line Status Pink 12/12/22 1859       Peripheral IV 12/12/22 Left External jugular (Active)        Opportunity for questions and clarification was provided.       Patient transported with:  Jayce Morton

## 2023-01-19 RX ORDER — HYDRALAZINE HYDROCHLORIDE 50 MG/1
TABLET, FILM COATED ORAL
Qty: 270 TABLET | Refills: 0 | Status: SHIPPED | OUTPATIENT
Start: 2023-01-19

## 2023-01-29 PROBLEM — Z98.890 HISTORY OF AAA (ABDOMINAL AORTIC ANEURYSM) REPAIR: Status: ACTIVE | Noted: 2023-01-29

## 2023-01-29 PROBLEM — I11.9 MALIGNANT HYPERTENSIVE HEART DISEASE WITHOUT HEART FAILURE: Status: RESOLVED | Noted: 2022-05-10 | Resolved: 2023-01-29

## 2023-01-29 PROBLEM — K58.8 OTHER IRRITABLE BOWEL SYNDROME: Status: ACTIVE | Noted: 2020-09-23

## 2023-01-29 PROBLEM — J06.9 URI (UPPER RESPIRATORY INFECTION): Status: RESOLVED | Noted: 2020-09-23 | Resolved: 2023-01-29

## 2023-01-29 PROBLEM — G89.29 CHRONIC NONINTRACTABLE HEADACHE, UNSPECIFIED HEADACHE TYPE: Status: ACTIVE | Noted: 2023-01-29

## 2023-01-29 PROBLEM — R79.89 AZOTEMIA: Status: RESOLVED | Noted: 2020-09-23 | Resolved: 2023-01-29

## 2023-01-29 PROBLEM — R11.2 NAUSEA AND VOMITING: Status: RESOLVED | Noted: 2022-11-30 | Resolved: 2023-01-29

## 2023-01-29 PROBLEM — R51.9 CHRONIC NONINTRACTABLE HEADACHE, UNSPECIFIED HEADACHE TYPE: Status: ACTIVE | Noted: 2023-01-29

## 2023-01-29 PROBLEM — I71.40 ABDOMINAL AORTIC ANEURYSM (AAA) (HCC): Status: RESOLVED | Noted: 2020-09-23 | Resolved: 2023-01-29

## 2023-01-29 PROBLEM — F17.218 CIGARETTE NICOTINE DEPENDENCE WITH OTHER NICOTINE-INDUCED DISORDER: Status: ACTIVE | Noted: 2020-09-23

## 2023-01-29 PROBLEM — E78.00 PURE HYPERCHOLESTEROLEMIA: Status: ACTIVE | Noted: 2023-01-29

## 2023-01-29 PROBLEM — Z11.59 NEED FOR HEPATITIS C SCREENING TEST: Status: ACTIVE | Noted: 2023-01-29

## 2023-01-29 PROBLEM — I71.40 ABDOMINAL AORTIC ANEURYSM (AAA): Status: RESOLVED | Noted: 2020-09-23 | Resolved: 2023-01-29

## 2023-01-30 RX ORDER — CLONIDINE 0.2 MG/24H
PATCH, EXTENDED RELEASE TRANSDERMAL
Qty: 12 PATCH | Refills: 0 | Status: SHIPPED | OUTPATIENT
Start: 2023-01-30 | End: 2023-02-03 | Stop reason: SDUPTHER

## 2023-02-03 ENCOUNTER — OFFICE VISIT (OUTPATIENT)
Dept: INTERNAL MEDICINE CLINIC | Age: 73
End: 2023-02-03
Payer: MEDICARE

## 2023-02-03 VITALS
HEART RATE: 64 BPM | WEIGHT: 109 LBS | OXYGEN SATURATION: 98 % | SYSTOLIC BLOOD PRESSURE: 130 MMHG | HEIGHT: 62 IN | BODY MASS INDEX: 20.06 KG/M2 | DIASTOLIC BLOOD PRESSURE: 70 MMHG | RESPIRATION RATE: 16 BRPM

## 2023-02-03 DIAGNOSIS — M79.7 FIBROMYOSITIS: ICD-10-CM

## 2023-02-03 DIAGNOSIS — M15.9 OSTEOARTHRITIS OF MULTIPLE JOINTS, UNSPECIFIED OSTEOARTHRITIS TYPE: ICD-10-CM

## 2023-02-03 DIAGNOSIS — N95.9 MENOPAUSAL AND PERIMENOPAUSAL DISORDER: ICD-10-CM

## 2023-02-03 DIAGNOSIS — F17.218 CIGARETTE NICOTINE DEPENDENCE WITH OTHER NICOTINE-INDUCED DISORDER: ICD-10-CM

## 2023-02-03 DIAGNOSIS — I10 ESSENTIAL HYPERTENSION: Primary | ICD-10-CM

## 2023-02-03 DIAGNOSIS — F32.9 REACTIVE DEPRESSION: ICD-10-CM

## 2023-02-03 DIAGNOSIS — Z11.59 NEED FOR HEPATITIS C SCREENING TEST: ICD-10-CM

## 2023-02-03 DIAGNOSIS — E78.00 PURE HYPERCHOLESTEROLEMIA: ICD-10-CM

## 2023-02-03 DIAGNOSIS — N25.81 HYPERPARATHYROIDISM, SECONDARY RENAL (HCC): ICD-10-CM

## 2023-02-03 DIAGNOSIS — Z98.890 HISTORY OF AAA (ABDOMINAL AORTIC ANEURYSM) REPAIR: ICD-10-CM

## 2023-02-03 DIAGNOSIS — R05.8 COUGH PRODUCTIVE OF YELLOW SPUTUM: ICD-10-CM

## 2023-02-03 DIAGNOSIS — N18.6 ESRD ON DIALYSIS (HCC): ICD-10-CM

## 2023-02-03 DIAGNOSIS — Z12.31 SCREENING MAMMOGRAM FOR BREAST CANCER: ICD-10-CM

## 2023-02-03 DIAGNOSIS — Z99.2 ESRD ON DIALYSIS (HCC): ICD-10-CM

## 2023-02-03 DIAGNOSIS — F51.01 PRIMARY INSOMNIA: ICD-10-CM

## 2023-02-03 DIAGNOSIS — G60.9 IDIOPATHIC PERIPHERAL NEUROPATHY: ICD-10-CM

## 2023-02-03 DIAGNOSIS — K58.8 OTHER IRRITABLE BOWEL SYNDROME: ICD-10-CM

## 2023-02-03 RX ORDER — ALBUTEROL SULFATE 90 UG/1
1 AEROSOL, METERED RESPIRATORY (INHALATION)
Qty: 18 G | Refills: 2 | Status: SHIPPED | OUTPATIENT
Start: 2023-02-03

## 2023-02-03 RX ORDER — SERTRALINE HYDROCHLORIDE 25 MG/1
25 TABLET, FILM COATED ORAL DAILY
COMMUNITY
End: 2023-02-03 | Stop reason: SDUPTHER

## 2023-02-03 RX ORDER — LORATADINE 10 MG/1
10 TABLET ORAL
Qty: 90 TABLET | Refills: 1 | Status: SHIPPED | OUTPATIENT
Start: 2023-02-03

## 2023-02-03 RX ORDER — ONDANSETRON 4 MG/1
4 TABLET, ORALLY DISINTEGRATING ORAL
COMMUNITY
End: 2023-02-03 | Stop reason: SDUPTHER

## 2023-02-03 RX ORDER — CARVEDILOL 12.5 MG/1
12.5 TABLET ORAL 2 TIMES DAILY
Qty: 180 TABLET | Refills: 2 | Status: SHIPPED | OUTPATIENT
Start: 2023-02-03

## 2023-02-03 RX ORDER — CLONIDINE 0.2 MG/24H
1 PATCH, EXTENDED RELEASE TRANSDERMAL
Qty: 12 PATCH | Refills: 2 | Status: SHIPPED | OUTPATIENT
Start: 2023-02-03

## 2023-02-03 RX ORDER — ONDANSETRON 4 MG/1
4 TABLET, ORALLY DISINTEGRATING ORAL
Qty: 30 TABLET | Refills: 2 | Status: SHIPPED | OUTPATIENT
Start: 2023-02-03

## 2023-02-03 RX ORDER — HYDRALAZINE HYDROCHLORIDE 25 MG/1
25 TABLET, FILM COATED ORAL EVERY MORNING
Qty: 90 TABLET | Refills: 2 | Status: SHIPPED | OUTPATIENT
Start: 2023-02-03

## 2023-02-03 RX ORDER — FAMOTIDINE 40 MG/1
40 TABLET, FILM COATED ORAL DAILY
Qty: 90 TABLET | Refills: 1 | Status: SHIPPED | OUTPATIENT
Start: 2023-02-03

## 2023-02-03 RX ORDER — PRAVASTATIN SODIUM 40 MG/1
40 TABLET ORAL
Qty: 90 TABLET | Refills: 2 | Status: SHIPPED | OUTPATIENT
Start: 2023-02-03

## 2023-02-03 RX ORDER — SERTRALINE HYDROCHLORIDE 25 MG/1
25 TABLET, FILM COATED ORAL DAILY
Qty: 90 TABLET | Refills: 2 | Status: SHIPPED | OUTPATIENT
Start: 2023-02-03

## 2023-02-03 RX ORDER — DEXTROMETHORPHAN HYDROBROMIDE, GUAIFENESIN 5; 100 MG/5ML; MG/5ML
650 LIQUID ORAL
Qty: 90 TABLET | Refills: 1 | Status: SHIPPED | OUTPATIENT
Start: 2023-02-03

## 2023-02-03 RX ORDER — NIFEDIPINE 60 MG/1
60 TABLET, EXTENDED RELEASE ORAL EVERY 12 HOURS
Qty: 180 TABLET | Refills: 2 | Status: SHIPPED | OUTPATIENT
Start: 2023-02-03

## 2023-02-03 NOTE — PROGRESS NOTES
Ayush Herrera (: 1950) is a 71 y.o. female, established patient, here for evaluation of the following chief complaint(s):  Establish Care (Former [facility] patient )         ASSESSMENT/PLAN:  Below is the assessment and plan developed based on review of pertinent history, physical exam, labs, studies, and medications. 1. Essential hypertension  2. ESRD on dialysis ([facility] [address]  -     XR CHEST PA LAT; Future  3. Pure hypercholesterolemia  4. Fibromyositis  5. Reactive depression  6. Idiopathic peripheral neuropathy  7. Other irritable bowel syndrome  8. Hyperparathyroidism, secondary renal ([address]  9. Primary insomnia  10. Cigarette nicotine dependence with other nicotine-induced disorder  -     XR CHEST PA LAT; Future  11. History of AAA (abdominal aortic aneurysm) repair  12. Need for hepatitis C screening test  13. Osteoarthritis of multiple joints, unspecified osteoarthritis type  14. Menopausal and perimenopausal disorder  -     DEXA BONE DENSITY STUDY AXIAL; Future  15. Screening mammogram for breast cancer  -     [facility] 3D LARRY W MAMMO BI SCREENING INCL CAD; Future  16. Cough productive of yellow sputum  -     XR CHEST PA LAT; Future    Hypertension well controlled in right upper arm she has left AV graft in left forearm. Continued nifedipine ER 60 mg every 12 hourly, clonidine 0.2 mg once a week patch and carvedilol 12.5 mg twice a day hydralazine 25 mg in the morning. Refill sent to the pharmacy. Renal diet. She is recommended to have the protein shake having loss of weight after surgery.     ESRD on hemodialysis follows Dr. Carmelo Calvillo at [facility] dialysis  and Saturday and she will discuss about any intake of vitamin D that she can take or any vitamins    Anxiety with no active depression taking sertraline 25 mg/day strongly recommended to continue    Sometimes having nausea multifactorial she takes ondansetron as needed side effects discussed including constipation and drowsiness and recommended to take famotidine 40 mg/day recommended to quit smoking cigarette    Cough with yellow sputum since long she thinks that it is due to the air in the dialysis center that she is getting and she has to wear and cover with blankets since she has smoking history I ordered x-ray chest before giving her on antibiotic might have smoker's cough also and started on albuterol inhaler and she is allergic to dexamethasone so cannot be given maintenance inhaler with steroid containing    Lumbar spondylosis recommended to hold methocarbamol until she gets answer from nephrologist whether and what dose she can take or whether she can take or not and she can take plain Tylenol 650 twice a day as needed and continued gabapentin 100 mg at bedtime    Smoking cessation counseling given    Age-appropriate screening that she wants to put hold    Home health and PT OT offered she wants to wait and hold on it    Labs she has done hepatitis C at the dialysis center and age-appropriate preventive vaccinations advised    Reviewed the notes. She also has fibromyalgia continued on gabapentin    Refills given. X-ray ordered. I reviewed notes from Dr. Donald Farley dated 2021 she used to see rheumatologist for fibromyalgia and IBS and she had seen Dr. 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      Return in about 3 months (around 5/3/2023) for follow up for chronic condition.         SUBJECTIVE/OBJECTIVE:  HPI    Akhil Royalty with very pleasant personality used to see previous PCP Dr. Donald Farley and later on  Syl Lopes. She wants to establish with me. She has multiple comorbidities. I reviewed her charts and consult notes in the epic system reviewed her labs. She has hypertension with ESRD getting hemodialysis 3 days a week Tuesday Thursday and Saturday with Dr. Lindsey Strange. She takes at [facility] dialysis and she has left AV graft and fistula was not working. Today her blood pressure in right upper arm is controlled and she says that losartan was causing her dizziness    She had AAA repair last year and she had superior mesenteric artery stenosis repaired in December 2022 when she developed the hematoma in the right groin and that is receding in the size and she has follow-up appointment with Dr. Evelyn Mckeon on 16 February at [facility] and she has been taken off from low-dose aspirin and clopidogrel due to large hematoma but according to her it is decreasing in size and I examined it seems like it is organized near the right inguinal area. She says that she does not want to do any screening test like mammogram or colonoscopy or Cologuard or bone density right now because she is going to get the strength back. I offered her home health or OT PT she says she will do later on    She has been smoking half pack of cigarette lasting for 1 week. She has some anxiety but stable on low-dose of sertraline does not want to increase    She has lumbar spondylosis and her orthopedic surgeon gives her Tylenol codeine that she avoids and takes only as needed and also getting methocarbamol 750 mg 2 tablets twice a day as needed but recommended to cut back the dose and to ask Dr. Lindsey Strange about adjusting the dose having hemodialysis and getting hemodialysis it might be contraindicated. She was in ICU for 5 days at [facility] in December. She is pleasant personality no depression. She takes Zofran only as needed.       Allergies   Allergen Reactions    Dexamethasone Other (comments)     Edema       Current Outpatient Medications   Medication Sig    carvediloL (COREG) 12.5 mg tablet Take 1 Tablet by mouth two (2) times a day. hydrALAZINE (APRESOLINE) 25 mg tablet Take 1 Tablet by mouth Every morning. NIFEdipine ER (ADALAT CC) 60 mg ER tablet Take 1 Tablet by mouth every twelve (12) hours. cloNIDine (CATAPRES) 0.2 mg/24 hr ptwk 1 Patch by TransDERmal route every seven (7) days. sertraline (ZOLOFT) 25 mg tablet Take 1 Tablet by mouth daily. pravastatin (PRAVACHOL) 40 mg tablet Take 1 Tablet by mouth nightly. Indications: high cholesterol    ondansetron (ZOFRAN ODT) 4 mg disintegrating tablet Take 1 Tablet by mouth two (2) times daily as needed for Nausea or Vomiting.    loratadine (CLARITIN) 10 mg tablet Take 1 Tablet by mouth daily as needed for Allergies. albuterol (PROVENTIL HFA, VENTOLIN HFA, PROAIR HFA) 90 mcg/actuation inhaler Take 1 Puff by inhalation every six (6) hours as needed for Wheezing. famotidine (PEPCID) 40 mg tablet Take 1 Tablet by mouth daily. acetaminophen (Tylenol 8 Hour) 650 mg TbER Take 1 Tablet by mouth two (2) times daily as needed for Pain. Col-Rite 100 mg capsule take 1 capsule by mouth twice a day for 90 DAYS    gabapentin (NEURONTIN) 100 mg capsule Take 100 mg by mouth nightly. methocarbamoL (ROBAXIN) 750 mg tablet Take 2 Tabs by mouth as needed. clobetasoL (TEMOVATE) 0.05 % ointment Apply  to affected area two (2) times a day. No current facility-administered medications for this visit.      Past Medical History:   Diagnosis Date    Abdominal aortic aneurysm (AAA) 9/23/2020    Asthmatic bronchitis 9/23/2020    Azotemia 9/23/2020    Benign paroxysmal positional vertigo 9/23/2020    Contact dermatitis 9/23/2020    Degenerative joint disease involving multiple joints 9/23/2020    Edema 9/23/2020    Essential hypertension 9/23/2020    Fibromyalgia 9/23/2020    Fibromyositis 9/23/2020    Hyperlipidemia 9/23/2020    Idiopathic peripheral neuropathy 9/23/2020    Insomnia 9/23/2020    Irritable bowel syndrome 9/23/2020    Low back pain 9/23/2020    Reactive depression 9/23/2020    Tobacco dependence syndrome 9/23/2020    URI (upper respiratory infection) 9/23/2020     Past Surgical History:   Procedure Laterality Date    HX CHOLECYSTECTOMY  1996    HX HYSTERECTOMY  1995    HX KNEE REPLACEMENT  1980    HX ORTHOPAEDIC Bilateral 2000    carpal tunnel release    HX ORTHOPAEDIC  1996    Right thumb surgery    HX PARATHYROIDECTOMY  1985     Family History   Problem Relation Age of Onset    Hypertension Mother     Stroke Mother     Heart Disease Mother     Hypertension Father     Stroke Father      Social History     Tobacco Use   Smoking Status Every Day    Packs/day: 0.50    Years: 18.00    Pack years: 9.00    Types: Cigarettes   Smokeless Tobacco Never         Review of Systems   Constitutional: Negative. HENT:  Negative for sinus pain and sore throat. Eyes:  Negative for blurred vision. Respiratory:  Positive for cough and sputum production. Negative for hemoptysis, shortness of breath and wheezing. Yellow sputum since long thinks coming from air coming in the dialysis center,   Cardiovascular: Negative. Gastrointestinal: Negative. Genitourinary: Negative. Musculoskeletal: Negative. Neurological:  Negative for dizziness, tingling, sensory change and headaches. Psychiatric/Behavioral: Negative. Vitals:    02/03/23 0828 02/03/23 0855   BP: 132/70 130/70   Pulse: 68 64   Resp: 16    SpO2: 98%    Weight: 109 lb (49.4 kg)    Height: 5' 2\" (1.575 m)           Physical Exam  Vitals and nursing note reviewed. Constitutional:       General: She is not in acute distress. Appearance: She is not ill-appearing or toxic-appearing. Eyes:      Pupils: Pupils are equal, round, and reactive to light. Cardiovascular:      Rate and Rhythm: Normal rate and regular rhythm. Pulses: Normal pulses. Heart sounds: Normal heart sounds.  No murmur heard.  Pulmonary:      Effort: Pulmonary effort is normal. No respiratory distress. Breath sounds: Normal breath sounds. No wheezing. Abdominal:      General: Abdomen is flat. Bowel sounds are normal. There is no distension. Palpations: Abdomen is soft. Tenderness: There is no abdominal tenderness. Musculoskeletal:         General: No tenderness. Cervical back: Neck supple. Right lower leg: No edema. Left lower leg: No edema. Comments: Localized hematoma rt groin ,/chronic. decreasing in size as per patient,    Using rollator and walker. Skin:     Findings: No bruising. Neurological:      General: No focal deficit present. Mental Status: She is alert and oriented to person, place, and time. Cranial Nerves: No cranial nerve deficit. Motor: No weakness. Psychiatric:         Mood and Affect: Mood normal.         Behavior: Behavior normal.         On this date 02/03/2023 I have spent 40 minutes reviewing previous notes, test results and face to face with the patient discussing the diagnosis and importance of compliance with the treatment plan as well as documenting on the day of the visit. An electronic signature was used to authenticate this note.   -- Hodna Madera MD

## 2023-02-03 NOTE — PROGRESS NOTES
Chief Complaint   Patient presents with    [facility]     Former [facility] patient        Visit Vitals  /70 (BP 1 Location: Left upper arm, BP Patient Position: Sitting)   Pulse 68   Resp 16   Ht 5' 2\" (1.575 m)   Wt 109 lb (49.4 kg)   SpO2 98%   BMI 19.94 kg/m²           1. \"Have you been to the ER, urgent care clinic since your last visit? Hospitalized since your last visit? \"  12/12/2022 [facility]     2. \"Have you seen or consulted any other health care providers outside of the [address] [place] [address] since your last visit? \"  Dialysis       3. For patients aged 39-70: Has the patient had a colonoscopy / FIT/ Cologuard? Yes - Care Gap present. Rooming MA/LPN to request most recent results      If the patient is female:    4. For patients aged 41-77: Has the patient had a mammogram within the past 2 years? No      5. For patients aged 21-65: Has the patient had a pap smear?  NA - based on age or sex

## 2023-02-14 ENCOUNTER — APPOINTMENT (OUTPATIENT)
Dept: INTERVENTIONAL RADIOLOGY/VASCULAR | Age: 73
End: 2023-02-14
Attending: EMERGENCY MEDICINE
Payer: MEDICARE

## 2023-02-14 ENCOUNTER — TELEPHONE (OUTPATIENT)
Dept: INTERNAL MEDICINE CLINIC | Age: 73
End: 2023-02-14

## 2023-02-14 ENCOUNTER — HOSPITAL ENCOUNTER (EMERGENCY)
Age: 73
Discharge: HOME | End: 2023-02-14
Attending: EMERGENCY MEDICINE
Payer: MEDICARE

## 2023-02-14 ENCOUNTER — APPOINTMENT (OUTPATIENT)
Dept: GENERAL RADIOLOGY | Age: 73
End: 2023-02-14
Attending: EMERGENCY MEDICINE
Payer: MEDICARE

## 2023-02-14 VITALS
HEIGHT: 62 IN | BODY MASS INDEX: 20.06 KG/M2 | RESPIRATION RATE: 16 BRPM | TEMPERATURE: 98 F | HEART RATE: 64 BPM | OXYGEN SATURATION: 96 % | WEIGHT: 109 LBS | DIASTOLIC BLOOD PRESSURE: 98 MMHG | SYSTOLIC BLOOD PRESSURE: 159 MMHG

## 2023-02-14 DIAGNOSIS — Z99.2 ESRD NEEDING DIALYSIS (HCC): Primary | ICD-10-CM

## 2023-02-14 DIAGNOSIS — N18.6 ESRD NEEDING DIALYSIS (HCC): Primary | ICD-10-CM

## 2023-02-14 DIAGNOSIS — E87.5 ACUTE HYPERKALEMIA: ICD-10-CM

## 2023-02-14 DIAGNOSIS — T82.49XA CLOTTED DIALYSIS ACCESS, INITIAL ENCOUNTER (HCC): ICD-10-CM

## 2023-02-14 LAB
ALBUMIN SERPL-MCNC: 3.8 G/DL (ref 3.5–5)
ALBUMIN/GLOB SERPL: 1.1 (ref 1.1–2.2)
ALP SERPL-CCNC: 88 U/L (ref 45–117)
ALT SERPL-CCNC: 29 U/L (ref 12–78)
ANION GAP SERPL CALC-SCNC: 3 MMOL/L (ref 5–15)
AST SERPL W P-5'-P-CCNC: 23 U/L (ref 15–37)
ATRIAL RATE: 54 BPM
BASOPHILS # BLD: 0 K/UL (ref 0–0.1)
BASOPHILS NFR BLD: 1 % (ref 0–1)
BILIRUB SERPL-MCNC: 0.4 MG/DL (ref 0.2–1)
BUN SERPL-MCNC: 67 MG/DL (ref 6–20)
BUN/CREAT SERPL: 10 (ref 12–20)
CA-I BLD-MCNC: 10 MG/DL (ref 8.5–10.1)
CALCULATED P AXIS, ECG09: 48 DEGREES
CALCULATED R AXIS, ECG10: -9 DEGREES
CALCULATED T AXIS, ECG11: 23 DEGREES
CHLORIDE SERPL-SCNC: 114 MMOL/L (ref 97–108)
CO2 SERPL-SCNC: 22 MMOL/L (ref 21–32)
CREAT SERPL-MCNC: 6.98 MG/DL (ref 0.55–1.02)
DIAGNOSIS, 93000: NORMAL
DIFFERENTIAL METHOD BLD: ABNORMAL
EOSINOPHIL # BLD: 0.2 K/UL (ref 0–0.4)
EOSINOPHIL NFR BLD: 4 % (ref 0–7)
ERYTHROCYTE [DISTWIDTH] IN BLOOD BY AUTOMATED COUNT: 16.5 % (ref 11.5–14.5)
GLOBULIN SER CALC-MCNC: 3.4 G/DL (ref 2–4)
GLUCOSE SERPL-MCNC: 88 MG/DL (ref 65–100)
HCT VFR BLD AUTO: 36.5 % (ref 35–47)
HGB BLD-MCNC: 11.5 G/DL (ref 11.5–16)
IMM GRANULOCYTES # BLD AUTO: 0 K/UL (ref 0–0.04)
IMM GRANULOCYTES NFR BLD AUTO: 1 % (ref 0–0.5)
LYMPHOCYTES # BLD: 1 K/UL (ref 0.8–3.5)
LYMPHOCYTES NFR BLD: 18 % (ref 12–49)
MAGNESIUM SERPL-MCNC: 2.5 MG/DL (ref 1.6–2.4)
MCH RBC QN AUTO: 28.5 PG (ref 26–34)
MCHC RBC AUTO-ENTMCNC: 31.5 G/DL (ref 30–36.5)
MCV RBC AUTO: 90.6 FL (ref 80–99)
MONOCYTES # BLD: 0.3 K/UL (ref 0–1)
MONOCYTES NFR BLD: 5 % (ref 5–13)
NEUTS SEG # BLD: 4.1 K/UL (ref 1.8–8)
NEUTS SEG NFR BLD: 71 % (ref 32–75)
NRBC # BLD: 0 K/UL (ref 0–0.01)
NRBC BLD-RTO: 0 PER 100 WBC
P-R INTERVAL, ECG05: 158 MS
PLATELET # BLD AUTO: 105 K/UL (ref 150–400)
PMV BLD AUTO: 12.3 FL (ref 8.9–12.9)
POTASSIUM SERPL-SCNC: 6.6 MMOL/L (ref 3.5–5.1)
PROT SERPL-MCNC: 7.2 G/DL (ref 6.4–8.2)
Q-T INTERVAL, ECG07: 444 MS
QRS DURATION, ECG06: 92 MS
QTC CALCULATION (BEZET), ECG08: 421 MS
RBC # BLD AUTO: 4.03 M/UL (ref 3.8–5.2)
SODIUM SERPL-SCNC: 139 MMOL/L (ref 136–145)
VENTRICULAR RATE, ECG03: 54 BPM
WBC # BLD AUTO: 5.7 K/UL (ref 3.6–11)

## 2023-02-14 PROCEDURE — 87340 HEPATITIS B SURFACE AG IA: CPT

## 2023-02-14 PROCEDURE — 85025 COMPLETE CBC W/AUTO DIFF WBC: CPT

## 2023-02-14 PROCEDURE — 77001 FLUOROGUIDE FOR VEIN DEVICE: CPT

## 2023-02-14 PROCEDURE — 83735 ASSAY OF MAGNESIUM: CPT

## 2023-02-14 PROCEDURE — 71045 X-RAY EXAM CHEST 1 VIEW: CPT

## 2023-02-14 PROCEDURE — C1769 GUIDE WIRE: HCPCS

## 2023-02-14 PROCEDURE — 74011250636 HC RX REV CODE- 250/636: Performed by: STUDENT IN AN ORGANIZED HEALTH CARE EDUCATION/TRAINING PROGRAM

## 2023-02-14 PROCEDURE — 36415 COLL VENOUS BLD VENIPUNCTURE: CPT

## 2023-02-14 PROCEDURE — 99285 EMERGENCY DEPT VISIT HI MDM: CPT

## 2023-02-14 PROCEDURE — 80053 COMPREHEN METABOLIC PANEL: CPT

## 2023-02-14 PROCEDURE — 76937 US GUIDE VASCULAR ACCESS: CPT

## 2023-02-14 PROCEDURE — 74011250636 HC RX REV CODE- 250/636

## 2023-02-14 PROCEDURE — 75810000455 HC PLCMT CENT VENOUS CATH LVL 2 5182

## 2023-02-14 PROCEDURE — 74011000636 HC RX REV CODE- 636: Performed by: EMERGENCY MEDICINE

## 2023-02-14 PROCEDURE — G0257 UNSCHED DIALYSIS ESRD PT HOS: HCPCS

## 2023-02-14 RX ORDER — HEPARIN SODIUM 1000 [USP'U]/ML
3800 INJECTION, SOLUTION INTRAVENOUS; SUBCUTANEOUS ONCE
Status: COMPLETED | OUTPATIENT
Start: 2023-02-14 | End: 2023-02-14

## 2023-02-14 RX ORDER — HEPARIN SODIUM 1000 [USP'U]/ML
INJECTION, SOLUTION INTRAVENOUS; SUBCUTANEOUS
Status: COMPLETED
Start: 2023-02-14 | End: 2023-02-14

## 2023-02-14 RX ORDER — FENTANYL CITRATE 50 UG/ML
12.5-5 INJECTION, SOLUTION INTRAMUSCULAR; INTRAVENOUS
Status: DISCONTINUED | OUTPATIENT
Start: 2023-02-14 | End: 2023-02-14 | Stop reason: HOSPADM

## 2023-02-14 RX ORDER — BENZONATATE 100 MG/1
100 CAPSULE ORAL
Qty: 30 CAPSULE | Refills: 0 | Status: SHIPPED | OUTPATIENT
Start: 2023-02-14 | End: 2023-02-24

## 2023-02-14 RX ADMIN — FENTANYL CITRATE 50 MCG: 50 INJECTION, SOLUTION INTRAMUSCULAR; INTRAVENOUS at 15:10

## 2023-02-14 RX ADMIN — IOPAMIDOL 10 ML: 755 INJECTION, SOLUTION INTRAVENOUS at 16:05

## 2023-02-14 RX ADMIN — FENTANYL CITRATE 50 MCG: 50 INJECTION, SOLUTION INTRAMUSCULAR; INTRAVENOUS at 15:18

## 2023-02-14 RX ADMIN — HEPARIN SODIUM 3800 UNITS: 1000 INJECTION, SOLUTION INTRAVENOUS; SUBCUTANEOUS at 18:22

## 2023-02-14 RX ADMIN — HEPARIN SODIUM 3800 UNITS: 1000 INJECTION INTRAVENOUS; SUBCUTANEOUS at 18:22

## 2023-02-14 NOTE — BH NOTES
Ms. Andrew Buerger is a 60-year-old female with malignant and labile hypertension eventually developed ESRD on dialysis Tuesday Thursday Saturday  Obviously patient presented to dialysis unit on Saturday with thrombosed AV graft in the left upper extremity and she was told yesterday to go to ER to get dialysis catheter when vascular access declined to declot without dialysis treatment for 5 days. Obviously there was a concern about hyperkalemia which can lead to cardiac complications, and she does have a hyperkalemia with potassium of 6.6 today, hence patient received dialysis with 2K bath 2 hours and discharged to ER. Patient is scheduled to [facility]  Patient is seen and examined while she is receiving dialysis treatment in the hospital  Thank you  for ordering dialysis catheter placement   I will dialyze pt for 2 hrs today ,then she can be discharged home .

## 2023-02-14 NOTE — Clinical Note
Unable to successfully thread catheter in right EJ after multiple attempts. Re-prepped to access left EJ.

## 2023-02-14 NOTE — TELEPHONE ENCOUNTER
Patient came by office requesting a prescription be called in for the following medication to help with her cold and cough:      Benzonatote (Tessalon)

## 2023-02-14 NOTE — DIALYSIS
Hep B surface antigen outdated from clinic, spoke with Naty Lange in chemistry will add Hep B, order placed.

## 2023-02-14 NOTE — DIALYSIS
GCS 15 pt arrives from IR with left chest tunneled perm cath, slight oozing from site. Dialysis initiated via L chest perm cath. Dr. Lucian Calvo bedside with pt and orders received to run for 1.5h   In first minutes of treatment machine not showing any venous pressures ( 0 )and TMP -95, attempts to reseat cassette unsuccessful  no clots noted in circuit but treatment stopped and another  . Dr. Lucian Calvo notified. Pt spoke with  and this RN updated him with plan.

## 2023-02-14 NOTE — DIALYSIS
Received call from IR about pt and access. Orders received from Dr. Jenny Leonard. Pt will have tunneled perm cath placed. Pt has chair time tomorrow at dialysis at 1220 per [address] at [facility]. Dr. Jenny Leonard notified of chair time tomorrow and patient choice may have dialysis here for 2h and still attend dialysis tomorrow or no dialysis here and attend her scheduled appointment tomorrow at 1220 pm.  Pt opted for dialysis only at her clinic tomorrow. Cricket Mahoney IR RN notified. Pt's states her  will wait for her and take her home after procedure.

## 2023-02-14 NOTE — Clinical Note
[facility] EMERGENCY DEPT  [address] [place] [address] [place] [address]  [phone]    Work/School Note    Date: 2/14/2023    To Whom It May concern:      Martita Mckeon was seen and treated today in the emergency room by the following provider(s):  Attending Provider: Paola River MD.      Martita Mckeon is excused from work/school on 02/14/23. She is clear to return to work/school on 02/15/23.         Sincerely,          Sowmya Link MD

## 2023-02-14 NOTE — DIALYSIS
K 6.6, Dr Terese Flynn notified, will run pt for 2h and return to ER. Pt has clinic appointment tomorrow for 1220 pm and is aware she should attend dialysis. Cher notified at [facility] of plan for today. Fallon notified  in IR and will bring pt to dialysis once line is placed.

## 2023-02-14 NOTE — DIALYSIS
[facility] 15 report called to [facility] ER. Pt tolerated 2h dialysis with 500 ml fluid removal.  Left tunneled perm cath dressing changed and dated. Pt for d/c from ER.

## 2023-02-14 NOTE — DISCHARGE INSTRUCTIONS
Thank you! Thank you for allowing me to care for you in the emergency department. It is my goal to provide you with excellent care. If you have not received excellent quality care, please ask to speak to the nurse manager. Please fill out the survey that will come to you by mail or email since we listen to your feedback! Below you will find a list of your tests from today's visit. Should you have any questions, please do not hesitate to call the emergency department. Labs  Recent Results (from the past 12 hour(s))   CBC WITH AUTOMATED DIFF    Collection Time: 02/14/23 12:54 PM   Result Value Ref Range    WBC 5.7 3.6 - 11.0 K/uL    RBC 4.03 3.80 - 5.20 M/uL    HGB 11.5 11.5 - 16.0 g/dL    HCT 36.5 35.0 - 47.0 %    MCV 90.6 80.0 - 99.0 FL    MCH 28.5 26.0 - 34.0 PG    MCHC 31.5 30.0 - 36.5 g/dL    RDW 16.5 (H) 11.5 - 14.5 %    PLATELET 577 (L) 129 - 400 K/uL    MPV 12.3 8.9 - 12.9 FL    NRBC 0.0 0.0  WBC    ABSOLUTE NRBC 0.00 0.00 - 0.01 K/uL    NEUTROPHILS 71 32 - 75 %    LYMPHOCYTES 18 12 - 49 %    MONOCYTES 5 5 - 13 %    EOSINOPHILS 4 0 - 7 %    BASOPHILS 1 0 - 1 %    IMMATURE GRANULOCYTES 1 (H) 0 - 0.5 %    ABS. NEUTROPHILS 4.1 1.8 - 8.0 K/UL    ABS. LYMPHOCYTES 1.0 0.8 - 3.5 K/UL    ABS. MONOCYTES 0.3 0.0 - 1.0 K/UL    ABS. EOSINOPHILS 0.2 0.0 - 0.4 K/UL    ABS. BASOPHILS 0.0 0.0 - 0.1 K/UL    ABS. IMM.  GRANS. 0.0 0.00 - 0.04 K/UL    DF AUTOMATED     METABOLIC PANEL, COMPREHENSIVE    Collection Time: 02/14/23 12:54 PM   Result Value Ref Range    Sodium 139 136 - 145 mmol/L    Potassium 6.6 (HH) 3.5 - 5.1 mmol/L    Chloride 114 (H) 97 - 108 mmol/L    CO2 22 21 - 32 mmol/L    Anion gap 3 (L) 5 - 15 mmol/L    Glucose 88 65 - 100 mg/dL    BUN 67 (H) 6 - 20 mg/dL    Creatinine 6.98 (H) 0.55 - 1.02 mg/dL    BUN/Creatinine ratio 10 (L) 12 - 20      eGFR 6 (L) >60 ml/min/1.73m2    Calcium 10.0 8.5 - 10.1 mg/dL    Bilirubin, total 0.4 0.2 - 1.0 mg/dL    AST (SGOT) 23 15 - 37 U/L    ALT (SGPT) 29 12 - 78 U/L    Alk. phosphatase 88 45 - 117 U/L    Protein, total 7.2 6.4 - 8.2 g/dL    Albumin 3.8 3.5 - 5.0 g/dL    Globulin 3.4 2.0 - 4.0 g/dL    A-G Ratio 1.1 1.1 - 2.2     MAGNESIUM    Collection Time: 02/14/23 12:54 PM   Result Value Ref Range    Magnesium 2.5 (H) 1.6 - 2.4 mg/dL       Radiologic Studies  XR CHEST PORT   Final Result      No acute process on portable chest.         IR US GUIDED VASCULAR ACCESS    (Results Pending)   IR PLACE CVAD FLUORO GUIDE    (Results Pending)   IR INSERT TUNL CVC W/O PORT OVER 5 YR    (Results Pending)     CT Results  (Last 48 hours)      None          CXR Results  (Last 48 hours)                 02/14/23 1302  XR CHEST PORT Final result    Impression:      No acute process on portable chest.           Narrative:  EXAM:  XR CHEST PORT       INDICATION: End-stage renal disease       COMPARISON: 12/8/2021       TECHNIQUE: portable chest AP view       FINDINGS: The cardiac silhouette is within normal limits. The pulmonary   vasculature is within normal limits. The lungs and pleural spaces are clear. The visualized bones and upper abdomen   are age-appropriate.                 ------------------------------------------------------------------------------------------------------------  The exam and treatment you received in the Emergency Department were for an urgent problem and are not intended as complete care. It is important that you follow-up with a doctor, nurse practitioner, or physician assistant to:  (1) confirm your diagnosis,  (2) re-evaluation of changes in your illness and treatment, and  (3) for ongoing care. Please take your discharge instructions with you when you go to your follow-up appointment. If you have any problem arranging a follow-up appointment, contact the Emergency Department. If your symptoms become worse or you do not improve as expected and you are unable to reach your health care provider, please return to the Emergency Department.  We are available 24 hours a day. If a prescription has been provided, please have it filled as soon as possible to prevent a delay in treatment. If you have any questions or reservations about taking the medication due to side effects or interactions with other medications, please call your primary care provider or contact the ER.

## 2023-02-14 NOTE — DIALYSIS
To ER waiting room to update . Will keep him updated of progress and when pt arrives in the dialysis unit.

## 2023-02-14 NOTE — ED PROVIDER NOTES
EMERGENCY DEPARTMENT HISTORY AND PHYSICAL EXAM      Date: 2/14/2023  Patient Name: Jair Dominguez    History of Presenting Illness     Chief Complaint   Patient presents with    Vascular Access Problem       History Provided By: Patient    HPI: Jair Dominguez, 71 y.o. female with a past medical history significant renal insufficiency presents to the ED with chief complaint of Vascular Access Problem  . With end-stage renal disease on dialysis. Has not had dialysis in the last few days secondary to dialysis access issues. Has no complaints no shortness of breath no dizziness. There are no other complaints, changes, or physical findings at this time. PCP: Vasquez Cardona MD    Current Outpatient Medications   Medication Sig Dispense Refill    benzonatate (TESSALON) 100 mg capsule Take 1 Capsule by mouth three (3) times daily as needed for Cough for up to 10 days. 30 Capsule 0    carvediloL (COREG) 12.5 mg tablet Take 1 Tablet by mouth two (2) times a day. 180 Tablet 2    hydrALAZINE (APRESOLINE) 25 mg tablet Take 1 Tablet by mouth Every morning. 90 Tablet 2    NIFEdipine ER (ADALAT CC) 60 mg ER tablet Take 1 Tablet by mouth every twelve (12) hours. 180 Tablet 2    cloNIDine (CATAPRES) 0.2 mg/24 hr ptwk 1 Patch by TransDERmal route every seven (7) days. 12 Patch 2    sertraline (ZOLOFT) 25 mg tablet Take 1 Tablet by mouth daily. 90 Tablet 2    pravastatin (PRAVACHOL) 40 mg tablet Take 1 Tablet by mouth nightly. Indications: high cholesterol 90 Tablet 2    ondansetron (ZOFRAN ODT) 4 mg disintegrating tablet Take 1 Tablet by mouth two (2) times daily as needed for Nausea or Vomiting. 30 Tablet 2    loratadine (CLARITIN) 10 mg tablet Take 1 Tablet by mouth daily as needed for Allergies. 90 Tablet 1    albuterol (PROVENTIL HFA, VENTOLIN HFA, PROAIR HFA) 90 mcg/actuation inhaler Take 1 Puff by inhalation every six (6) hours as needed for Wheezing.  18 g 2    famotidine (PEPCID) 40 mg tablet Take 1 Tablet by mouth daily. 90 Tablet 1    acetaminophen (Tylenol 8 Hour) 650 mg TbER Take 1 Tablet by mouth two (2) times daily as needed for Pain. 90 Tablet 1    Col-Rite 100 mg capsule take 1 capsule by mouth twice a day for 90 DAYS 60 Capsule 2    clobetasoL (TEMOVATE) 0.05 % ointment Apply  to affected area two (2) times a day.      gabapentin (NEURONTIN) 100 mg capsule Take 100 mg by mouth nightly. methocarbamoL (ROBAXIN) 750 mg tablet Take 2 Tabs by mouth as needed.          Past History     Past Medical History:  Past Medical History:   Diagnosis Date    Abdominal aortic aneurysm (AAA) 9/23/2020    Asthmatic bronchitis 9/23/2020    Azotemia 9/23/2020    Benign paroxysmal positional vertigo 9/23/2020    Contact dermatitis 9/23/2020    Degenerative joint disease involving multiple joints 9/23/2020    Edema 9/23/2020    Essential hypertension 9/23/2020    Fibromyalgia 9/23/2020    Fibromyositis 9/23/2020    Hyperlipidemia 9/23/2020    Idiopathic peripheral neuropathy 9/23/2020    Insomnia 9/23/2020    Irritable bowel syndrome 9/23/2020    Low back pain 9/23/2020    Reactive depression 9/23/2020    Tobacco dependence syndrome 9/23/2020    URI (upper respiratory infection) 9/23/2020       Past Surgical History:  Past Surgical History:   Procedure Laterality Date    HX CHOLECYSTECTOMY  1996    HX HYSTERECTOMY  1995    HX KNEE REPLACEMENT  1980    HX ORTHOPAEDIC Bilateral 2000    carpal tunnel release    HX ORTHOPAEDIC  1996    Right thumb surgery    HX PARATHYROIDECTOMY  1985       Family History:  Family History   Problem Relation Age of Onset    Hypertension Mother     Stroke Mother     Heart Disease Mother     Hypertension Father     Stroke Father        Social History:  Social History     Tobacco Use    Smoking status: Every Day     Packs/day: 0.50     Years: 18.00     Pack years: 9.00     Types: Cigarettes    Smokeless tobacco: Never   Vaping Use    Vaping Use: Never used   Substance Use Topics Alcohol use: Never    Drug use: Never       Allergies: Allergies   Allergen Reactions    Dexamethasone Other (comments)     Edema           Review of Systems   Review of Systems   Constitutional: Negative. Negative for chills, fatigue and fever. HENT: Negative. Negative for congestion, nosebleeds and sore throat. Eyes: Negative. Negative for pain, discharge and visual disturbance. Respiratory:  Positive for shortness of breath. Negative for cough and chest tightness. Cardiovascular:  Negative for chest pain, palpitations and leg swelling. Gastrointestinal:  Negative for abdominal pain, blood in stool, constipation, diarrhea, nausea and vomiting. Endocrine: Negative. Genitourinary: Negative. Negative for difficulty urinating, dysuria, pelvic pain and vaginal bleeding. Musculoskeletal: Negative. Negative for arthralgias, back pain and myalgias. Skin: Negative. Negative for rash and wound. Allergic/Immunologic: Negative. Neurological: Negative. Negative for dizziness, syncope, weakness, numbness and headaches. Hematological: Negative. Psychiatric/Behavioral: Negative. Negative for agitation, confusion and suicidal ideas. All other systems reviewed and are negative. Positives and Pertinent negatives as per HPI. Physical Exam   Patient Vitals for the past 24 hrs:   Temp Pulse Resp BP SpO2   02/14/23 1445 -- 66 16 (!) 195/95 98 %   02/14/23 1435 -- 69 18 (!) 200/91 98 %   02/14/23 1425 -- 63 17 (!) 195/88 99 %   02/14/23 1415 -- 62 16 (!) 197/89 98 %   02/14/23 1400 -- (!) 52 16 (!) 190/85 98 %   02/14/23 1350 -- (!) 55 16 (!) 181/83 99 %   02/14/23 1247 -- -- -- -- 100 %   02/14/23 1206 97.2 °F (36.2 °C) (!) 50 17 129/71 100 %         Physical Exam  Vitals and nursing note reviewed. Exam conducted with a chaperone present. Constitutional:       Appearance: Normal appearance. She is normal weight. HENT:      Head: Normocephalic and atraumatic.       Nose: Nose normal.      Mouth/Throat:      Mouth: Mucous membranes are moist.      Pharynx: Oropharynx is clear. Eyes:      Extraocular Movements: Extraocular movements intact. Conjunctiva/sclera: Conjunctivae normal.      Pupils: Pupils are equal, round, and reactive to light. Cardiovascular:      Rate and Rhythm: Normal rate and regular rhythm. Pulses: Normal pulses. Heart sounds: Normal heart sounds. Pulmonary:      Effort: Pulmonary effort is normal. No respiratory distress. Breath sounds: Normal breath sounds. Abdominal:      General: Abdomen is flat. Bowel sounds are normal. There is no distension. Palpations: Abdomen is soft. Tenderness: There is no abdominal tenderness. There is no guarding. Musculoskeletal:         General: No swelling, tenderness, deformity or signs of injury. Normal range of motion. Cervical back: Normal range of motion and neck supple. Right lower leg: No edema. Left lower leg: No edema. Skin:     General: Skin is warm and dry. Capillary Refill: Capillary refill takes less than 2 seconds. Findings: No lesion or rash. Neurological:      General: No focal deficit present. Mental Status: She is alert and oriented to person, place, and time. Mental status is at baseline. Cranial Nerves: No cranial nerve deficit. Psychiatric:         Mood and Affect: Mood normal.         Behavior: Behavior normal.         Thought Content:  Thought content normal.         Judgment: Judgment normal.       Diagnostic Study Results     LABS:   Recent Results (from the past 12 hour(s))   CBC WITH AUTOMATED DIFF    Collection Time: 02/14/23 12:54 PM   Result Value Ref Range    WBC 5.7 3.6 - 11.0 K/uL    RBC 4.03 3.80 - 5.20 M/uL    HGB 11.5 11.5 - 16.0 g/dL    HCT 36.5 35.0 - 47.0 %    MCV 90.6 80.0 - 99.0 FL    MCH 28.5 26.0 - 34.0 PG    MCHC 31.5 30.0 - 36.5 g/dL    RDW 16.5 (H) 11.5 - 14.5 %    PLATELET 515 (L) 052 - 400 K/uL    MPV 12.3 8.9 - 12.9 FL    NRBC 0.0 0.0  WBC    ABSOLUTE NRBC 0.00 0.00 - 0.01 K/uL    NEUTROPHILS 71 32 - 75 %    LYMPHOCYTES 18 12 - 49 %    MONOCYTES 5 5 - 13 %    EOSINOPHILS 4 0 - 7 %    BASOPHILS 1 0 - 1 %    IMMATURE GRANULOCYTES 1 (H) 0 - 0.5 %    ABS. NEUTROPHILS 4.1 1.8 - 8.0 K/UL    ABS. LYMPHOCYTES 1.0 0.8 - 3.5 K/UL    ABS. MONOCYTES 0.3 0.0 - 1.0 K/UL    ABS. EOSINOPHILS 0.2 0.0 - 0.4 K/UL    ABS. BASOPHILS 0.0 0.0 - 0.1 K/UL    ABS. IMM. GRANS. 0.0 0.00 - 0.04 K/UL    DF AUTOMATED     METABOLIC PANEL, COMPREHENSIVE    Collection Time: 02/14/23 12:54 PM   Result Value Ref Range    Sodium 139 136 - 145 mmol/L    Potassium 6.6 (HH) 3.5 - 5.1 mmol/L    Chloride 114 (H) 97 - 108 mmol/L    CO2 22 21 - 32 mmol/L    Anion gap 3 (L) 5 - 15 mmol/L    Glucose 88 65 - 100 mg/dL    BUN 67 (H) 6 - 20 mg/dL    Creatinine 6.98 (H) 0.55 - 1.02 mg/dL    BUN/Creatinine ratio 10 (L) 12 - 20      eGFR 6 (L) >60 ml/min/1.73m2    Calcium 10.0 8.5 - 10.1 mg/dL    Bilirubin, total 0.4 0.2 - 1.0 mg/dL    AST (SGOT) 23 15 - 37 U/L    ALT (SGPT) 29 12 - 78 U/L    Alk. phosphatase 88 45 - 117 U/L    Protein, total 7.2 6.4 - 8.2 g/dL    Albumin 3.8 3.5 - 5.0 g/dL    Globulin 3.4 2.0 - 4.0 g/dL    A-G Ratio 1.1 1.1 - 2.2     MAGNESIUM    Collection Time: 02/14/23 12:54 PM   Result Value Ref Range    Magnesium 2.5 (H) 1.6 - 2.4 mg/dL        EKG: EKG interpreted independently by ER physician. RADIOLOGY:  Non-plain film images such as CT, Ultrasound and MRI are read by the radiologist. Plain radiographic images are visualized and preliminarily interpreted by the ED Provider with the below findings:          Interpretation per the Radiologist below, if available at the time of this note:     XR CHEST PORT    Result Date: 2/14/2023  EXAM:  XR CHEST PORT INDICATION: End-stage renal disease COMPARISON: 12/8/2021 TECHNIQUE: portable chest AP view FINDINGS: The cardiac silhouette is within normal limits.  The pulmonary vasculature is within normal limits. The lungs and pleural spaces are clear. The visualized bones and upper abdomen are age-appropriate. No acute process on portable chest.     CT Results  (Last 48 hours)      None          CXR Results  (Last 48 hours)                 02/14/23 1302  XR CHEST PORT Final result    Impression:      No acute process on portable chest.           Narrative:  EXAM:  XR CHEST PORT       INDICATION: End-stage renal disease       COMPARISON: 12/8/2021       TECHNIQUE: portable chest AP view       FINDINGS: The cardiac silhouette is within normal limits. The pulmonary   vasculature is within normal limits. The lungs and pleural spaces are clear. The visualized bones and upper abdomen   are age-appropriate. Medical Decision Making and ED Course       CC/HPI Summary, DDx, ED Course, and Reassessment: 4year-old female who has not had dialysis in the last few days presents with access issues. Plan to have IR evaluate see if emergent dialysis is indicated. cmp    These orders were placed during the ER evaluation:  Orders Placed This Encounter    XR CHEST PORT     Standing Status:   Standing     Number of Occurrences:   1     Order Specific Question:   Reason for Exam     Answer:   access needed esrd on hd    IR US GUIDED VASCULAR ACCESS     Standing Status:   Standing     Number of Occurrences:   1     Order Specific Question:   Transport     Answer:   No Transport [3]     Order Specific Question:   Reason for Exam     Answer:   dialysis catheter    IR PLACE CVAD FLUORO GUIDE     Standing Status:   Standing     Number of Occurrences:   1     Order Specific Question:   Transport     Answer:   Stretcher [5]     Order Specific Question:   Reason for Exam     Answer:   ESRD: Permacath    IR INSERT TUNL CVC W/O PORT OVER 5 YR     Standing Status:   Standing     Number of Occurrences:   1     Order Specific Question:   Transport     Answer:   No Transport [3]     Order Specific Question:   Reason for Exam     Answer:   diaysis catheter    CBC WITH AUTOMATED DIFF     Standing Status:   Standing     Number of Occurrences:   1    COMPREHENSIVE METABOLIC PANEL     Standing Status:   Standing     Number of Occurrences:   1    MAGNESIUM     Standing Status:   Standing     Number of Occurrences:   1    HEP B SURFACE AG     Standing Status:   Standing     Number of Occurrences:   1    IP CONSULT TO INTERVENTIONAL RADIOLOGY     Standing Status:   Standing     Number of Occurrences:   1     Order Specific Question:   Reason for Consult: Answer:   dialysis access needed VJ nephrology     Order Specific Question:   Did you call or speak to the consulting provider? Answer:   No     Order Specific Question:   Consult To     Answer:   ir     Order Specific Question:   Schedule When? Answer:   TODAY    IP CONSULT TO NEPHROLOGY     Standing Status:   Standing     Number of Occurrences:   1     Order Specific Question:   Reason for Consult: Answer:   neesds hd     Order Specific Question:   Did you call or speak to the consulting provider? Answer:   No     Order Specific Question:   Consult To     Answer:   esrd     Order Specific Question:   Schedule When? Answer:   TODAY    EKG, 12 LEAD, INITIAL     Standing Status:   Standing     Number of Occurrences:   1     Order Specific Question:   Reason for Exam:     Answer:   vascular access problem    HEMODIALYSIS INPATIENT Duration (hr): 2; Dialyzer: F180; Dialysate Bath:  K: 2; Dialysate Bath:  CA: 2.0; Dialysate Bath: Bicarb: 35; Profile (Beginning / mEq/L): 138; Access: Other (specify) (permacath); Blood Flow Rate (mL/min): 450; Dialysate F...      Remove fluid if she has any weight gain   Call me if there is any problem   No heparin with HD - if possible   If not she need heparin 1,000 units bolus     Standing Status:   Standing     Number of Occurrences:   1     Order Specific Question:   Duration (hr)     Answer:   2     Order Specific Question: Dialyzer     Answer:   K102     Order Specific Question:   Dialysate Bath:  K     Answer:   2     Order Specific Question:   Dialysate Bath:  CA     Answer:   2.0     Order Specific Question:   Dialysate Bath: Bicarb     Answer:   35     Order Specific Question:   Profile (Beginning / mEq/L)     Answer:   138     Order Specific Question:   Access     Answer: Other (specify)     Comments:   permacath     Order Specific Question:   Blood Flow Rate (mL/min)     Answer:   450     Order Specific Question:   Dialysate Flow Rate (mL/min)     Answer:   600     Order Specific Question:   Maximum Blood Flow (mL/minute)     Answer:   450        Patient was given the following medications:  Medications - No data to display        ED Course:              Vital Signs-Reviewed the patient's vital signs. Patient Vitals for the past 24 hrs:   Temp Pulse Resp BP SpO2   02/14/23 1445 -- 66 16 (!) 195/95 98 %   02/14/23 1435 -- 69 18 (!) 200/91 98 %   02/14/23 1425 -- 63 17 (!) 195/88 99 %   02/14/23 1415 -- 62 16 (!) 197/89 98 %   02/14/23 1400 -- (!) 52 16 (!) 190/85 98 %   02/14/23 1350 -- (!) 55 16 (!) 181/83 99 %   02/14/23 1247 -- -- -- -- 100 %   02/14/23 1206 97.2 °F (36.2 °C) (!) 50 17 129/71 100 %     Vitals interpreted independently by ER physician. stable    Medical decision making tools:                No data recorded          Disposition Considerations (Tests not done, Shared Decision Making, Pt Expectation of Test or Tx.): 76-year-old presents with dialysis access issues. We will consult interventional radiology for replacement of new access. Discussed the case with nephrology  who will arrange for 2 hours of dialysis given patient's potassium is elevated and discharge patient home she can go for her full treatment tomorrow at 12 PM at the dialysis center.     CONSULTS: (Who and What was discussed)  IP CONSULT TO INTERVENTIONAL RADIOLOGY  IP CONSULT TO NEPHROLOGY    Chronic Conditions: esrd    Social Determinants affecting Dx or Tx: None    Records Reviewed (source and summary of external notes): Prior medical records  Records Reviewed: Previous Hospital chart. EMS run report. I reviewed the vital signs, available nursing notes, past medical history, past surgical history, family history and social history. Initial assessment performed. The patients presenting problems have been discussed, and they are in agreement with the care plan formulated and outlined with them. I have encouraged them to ask questions as they arise throughout their visit. Discharged      Procedures               Disposition       Emergency Department Disposition:  Discharged    After HD if stable  Diagnosis     Clinical Impression:   1. ESRD needing dialysis ([facility] [address]    2. Clotted dialysis access, initial encounter ([facility] [address]    3. Acute hyperkalemia        Attestations:    Haim Bustillo MD    Please note that this dictation was completed with [employer], the computer voice recognition software. Quite often unanticipated grammatical, syntax, homophones, and other interpretive errors are inadvertently transcribed by the computer software. Please disregard these errors. Please excuse any errors that have escaped final proofreading. Thank you.

## 2023-02-14 NOTE — ED TRIAGE NOTES
Tuesday, Thursday, Saturday HD patient, received dialysis on last Thursday, when went on Saturday Graft was not working, was told needed to come to ED for temp catheter placement.   Denies any CP/ SOB    No dialysis in 2 days, nephrologist Dr. Dewey Ling

## 2023-02-15 LAB
HBV SURFACE AG SER QL: <0.1 INDEX
HBV SURFACE AG SER QL: NEGATIVE

## 2023-02-18 ENCOUNTER — APPOINTMENT (OUTPATIENT)
Dept: GENERAL RADIOLOGY | Age: 73
End: 2023-02-18
Attending: EMERGENCY MEDICINE
Payer: MEDICARE

## 2023-02-18 ENCOUNTER — HOSPITAL ENCOUNTER (EMERGENCY)
Age: 73
Discharge: HOME | End: 2023-02-18
Attending: EMERGENCY MEDICINE
Payer: MEDICARE

## 2023-02-18 VITALS
HEIGHT: 62 IN | SYSTOLIC BLOOD PRESSURE: 171 MMHG | TEMPERATURE: 98 F | OXYGEN SATURATION: 100 % | BODY MASS INDEX: 19.51 KG/M2 | HEART RATE: 78 BPM | DIASTOLIC BLOOD PRESSURE: 90 MMHG | WEIGHT: 106.04 LBS | RESPIRATION RATE: 19 BRPM

## 2023-02-18 DIAGNOSIS — N18.6 ESRD (END STAGE RENAL DISEASE) (HCC): ICD-10-CM

## 2023-02-18 DIAGNOSIS — T82.43XA LEAKAGE OF VASCULAR DIALYSIS CATHETER, INITIAL ENCOUNTER (HCC): Primary | ICD-10-CM

## 2023-02-18 LAB
ALBUMIN SERPL-MCNC: 3.9 G/DL (ref 3.5–5)
ALBUMIN/GLOB SERPL: 0.9 (ref 1.1–2.2)
ALP SERPL-CCNC: 91 U/L (ref 45–117)
ALT SERPL-CCNC: 21 U/L (ref 12–78)
ANION GAP SERPL CALC-SCNC: 4 MMOL/L (ref 5–15)
APTT PPP: 38.2 SEC (ref 21.2–34.1)
AST SERPL W P-5'-P-CCNC: 17 U/L (ref 15–37)
BASOPHILS # BLD: 0 K/UL (ref 0–0.1)
BASOPHILS NFR BLD: 1 % (ref 0–1)
BILIRUB SERPL-MCNC: 0.7 MG/DL (ref 0.2–1)
BUN SERPL-MCNC: 15 MG/DL (ref 6–20)
BUN/CREAT SERPL: 5 (ref 12–20)
CA-I BLD-MCNC: 9.3 MG/DL (ref 8.5–10.1)
CHLORIDE SERPL-SCNC: 103 MMOL/L (ref 97–108)
CO2 SERPL-SCNC: 28 MMOL/L (ref 21–32)
CREAT SERPL-MCNC: 2.91 MG/DL (ref 0.55–1.02)
DIFFERENTIAL METHOD BLD: ABNORMAL
EOSINOPHIL # BLD: 0.3 K/UL (ref 0–0.4)
EOSINOPHIL NFR BLD: 4 % (ref 0–7)
ERYTHROCYTE [DISTWIDTH] IN BLOOD BY AUTOMATED COUNT: 15.9 % (ref 11.5–14.5)
GLOBULIN SER CALC-MCNC: 4.2 G/DL (ref 2–4)
GLUCOSE SERPL-MCNC: 75 MG/DL (ref 65–100)
HCT VFR BLD AUTO: 34.5 % (ref 35–47)
HGB BLD-MCNC: 11.2 G/DL (ref 11.5–16)
IMM GRANULOCYTES # BLD AUTO: 0 K/UL (ref 0–0.04)
IMM GRANULOCYTES NFR BLD AUTO: 0 % (ref 0–0.5)
INR PPP: 1.1 (ref 0.9–1.1)
LYMPHOCYTES # BLD: 1.4 K/UL (ref 0.8–3.5)
LYMPHOCYTES NFR BLD: 21 % (ref 12–49)
MCH RBC QN AUTO: 28.4 PG (ref 26–34)
MCHC RBC AUTO-ENTMCNC: 32.5 G/DL (ref 30–36.5)
MCV RBC AUTO: 87.6 FL (ref 80–99)
MONOCYTES # BLD: 0.4 K/UL (ref 0–1)
MONOCYTES NFR BLD: 6 % (ref 5–13)
NEUTS SEG # BLD: 4.5 K/UL (ref 1.8–8)
NEUTS SEG NFR BLD: 68 % (ref 32–75)
NRBC # BLD: 0 K/UL (ref 0–0.01)
NRBC BLD-RTO: 0 PER 100 WBC
PLATELET # BLD AUTO: 110 K/UL (ref 150–400)
PMV BLD AUTO: 11.9 FL (ref 8.9–12.9)
POTASSIUM SERPL-SCNC: 3.5 MMOL/L (ref 3.5–5.1)
PROT SERPL-MCNC: 8.1 G/DL (ref 6.4–8.2)
PROTHROMBIN TIME: 13.8 SEC (ref 11.9–14.6)
RBC # BLD AUTO: 3.94 M/UL (ref 3.8–5.2)
SODIUM SERPL-SCNC: 135 MMOL/L (ref 136–145)
THERAPEUTIC RANGE,PTTT: ABNORMAL SEC
WBC # BLD AUTO: 6.6 K/UL (ref 3.6–11)

## 2023-02-18 PROCEDURE — 85610 PROTHROMBIN TIME: CPT

## 2023-02-18 PROCEDURE — 74011250637 HC RX REV CODE- 250/637: Performed by: EMERGENCY MEDICINE

## 2023-02-18 PROCEDURE — 36415 COLL VENOUS BLD VENIPUNCTURE: CPT

## 2023-02-18 PROCEDURE — G0257 UNSCHED DIALYSIS ESRD PT HOS: HCPCS

## 2023-02-18 PROCEDURE — 90935 HEMODIALYSIS ONE EVALUATION: CPT

## 2023-02-18 PROCEDURE — 85730 THROMBOPLASTIN TIME PARTIAL: CPT

## 2023-02-18 PROCEDURE — 80053 COMPREHEN METABOLIC PANEL: CPT

## 2023-02-18 PROCEDURE — 85025 COMPLETE CBC W/AUTO DIFF WBC: CPT

## 2023-02-18 PROCEDURE — 99283 EMERGENCY DEPT VISIT LOW MDM: CPT

## 2023-02-18 RX ORDER — HEPARIN SODIUM 1000 [USP'U]/ML
INJECTION, SOLUTION INTRAVENOUS; SUBCUTANEOUS
Status: DISCONTINUED
Start: 2023-02-18 | End: 2023-02-18 | Stop reason: HOSPADM

## 2023-02-18 RX ORDER — HYDROCODONE BITARTRATE AND ACETAMINOPHEN 5; 325 MG/1; MG/1
1 TABLET ORAL
Status: COMPLETED | OUTPATIENT
Start: 2023-02-18 | End: 2023-02-18

## 2023-02-18 RX ADMIN — HYDROCODONE BITARTRATE AND ACETAMINOPHEN 1 TABLET: 5; 325 TABLET ORAL at 13:50

## 2023-02-18 NOTE — DISCHARGE INSTRUCTIONS
1.  Keep the area clean and dry keep the gauze on it as discussed. Call your nephrologist on Monday. 2.  You should contact your vascular specialist.  If you are not able to get in touch with them I given you an additional name of a vascular doctor to consult through [facility] to help you. You will likely need to have additional treatment for this catheter, however you were dialyzed today. Your nephrologist would like you to plan to go back to your Dialysis treatment on Tuesday.

## 2023-02-18 NOTE — BH NOTES
Pt with ESRD on HD - TTS - sent to ER for bleeding at [facility] . Pt is being dialyzed today in the hospital with out heparin   Pt is seen and examined   B.P is low   Stop UFR   She need to keep up with Joselyn Ramos for declotting of graft.

## 2023-02-18 NOTE — ED TRIAGE NOTES
Pt complains of drainage and pain in the left chest dialysis catheter.   Pt states catheter was placed 5 days ago and she had dialysis 3x this week

## 2023-02-18 NOTE — ED PROVIDER NOTES
EMERGENCY DEPARTMENT HISTORY AND PHYSICAL EXAM      Date: 2/18/2023  Patient Name: Sobeida Be      History of Presenting Illness     Chief Complaint   Patient presents with    Vascular Access Problem       History Provided By: Patient and Patient's Daughter    Location/Duration/Severity/Modifying factors   80-year-old female presents to the emergency room with concerns about the left upper chest dialysis catheter. She states that was put in on Monday. She has had dialysis treatment 3 times to the catheter, most recently on Thursday. Interventional radiology reportedly put the catheter in however I do not see a procedure note. The patient went to have her dialysis treatment today as scheduled when they attempted to use the catheter they stated that it did not work. She states she is having intermittent bleeding around the catheter site and some pain. She is followed by Dr. Erica Quiroz with our nephrology service. Patient denies any other symptoms or complaints. Was referred here from dialysis today due to the nonfunctioning catheter previously had a left AV fistula in the forearm but reportedly it clotted off. Hence, this was the reasoning for the new catheter placement      Vascular Access Problem   Pertinent negatives include no numbness, no back pain and no neck pain. There are no other complaints, changes, or physical findings at this time. PCP: Viral Rosenbaum MD    Current Facility-Administered Medications   Medication Dose Route Frequency Provider Last Rate Last Admin    heparin (porcine) 1,000 unit/mL injection              Current Outpatient Medications   Medication Sig Dispense Refill    benzonatate (TESSALON) 100 mg capsule Take 1 Capsule by mouth three (3) times daily as needed for Cough for up to 10 days. 30 Capsule 0    carvediloL (COREG) 12.5 mg tablet Take 1 Tablet by mouth two (2) times a day.  180 Tablet 2    hydrALAZINE (APRESOLINE) 25 mg tablet Take 1 Tablet by mouth Every morning. 90 Tablet 2    NIFEdipine ER (ADALAT CC) 60 mg ER tablet Take 1 Tablet by mouth every twelve (12) hours. 180 Tablet 2    cloNIDine (CATAPRES) 0.2 mg/24 hr ptwk 1 Patch by TransDERmal route every seven (7) days. 12 Patch 2    sertraline (ZOLOFT) 25 mg tablet Take 1 Tablet by mouth daily. 90 Tablet 2    pravastatin (PRAVACHOL) 40 mg tablet Take 1 Tablet by mouth nightly. Indications: high cholesterol 90 Tablet 2    ondansetron (ZOFRAN ODT) 4 mg disintegrating tablet Take 1 Tablet by mouth two (2) times daily as needed for Nausea or Vomiting. 30 Tablet 2    loratadine (CLARITIN) 10 mg tablet Take 1 Tablet by mouth daily as needed for Allergies. 90 Tablet 1    albuterol (PROVENTIL HFA, VENTOLIN HFA, PROAIR HFA) 90 mcg/actuation inhaler Take 1 Puff by inhalation every six (6) hours as needed for Wheezing. 18 g 2    famotidine (PEPCID) 40 mg tablet Take 1 Tablet by mouth daily. 90 Tablet 1    acetaminophen (Tylenol 8 Hour) 650 mg TbER Take 1 Tablet by mouth two (2) times daily as needed for Pain. 90 Tablet 1    Col-Rite 100 mg capsule take 1 capsule by mouth twice a day for 90 DAYS 60 Capsule 2    clobetasoL (TEMOVATE) 0.05 % ointment Apply  to affected area two (2) times a day.      gabapentin (NEURONTIN) 100 mg capsule Take 100 mg by mouth nightly. methocarbamoL (ROBAXIN) 750 mg tablet Take 2 Tabs by mouth as needed.          Past History     Past Medical History:  Past Medical History:   Diagnosis Date    Abdominal aortic aneurysm (AAA) 9/23/2020    Asthmatic bronchitis 9/23/2020    Azotemia 9/23/2020    Benign paroxysmal positional vertigo 9/23/2020    Contact dermatitis 9/23/2020    Degenerative joint disease involving multiple joints 9/23/2020    Edema 9/23/2020    Essential hypertension 9/23/2020    Fibromyalgia 9/23/2020    Fibromyositis 9/23/2020    Hyperlipidemia 9/23/2020    Idiopathic peripheral neuropathy 9/23/2020    Insomnia 9/23/2020    Irritable bowel syndrome 9/23/2020    Low back pain 9/23/2020    Reactive depression 9/23/2020    Tobacco dependence syndrome 9/23/2020    URI (upper respiratory infection) 9/23/2020       Past Surgical History:  Past Surgical History:   Procedure Laterality Date    HX CHOLECYSTECTOMY  1996    HX HYSTERECTOMY  1995    HX KNEE REPLACEMENT  1980    HX ORTHOPAEDIC Bilateral 2000    carpal tunnel release    HX ORTHOPAEDIC  1996    Right thumb surgery    HX PARATHYROIDECTOMY  1985    IR INSERT TUNL CVC W/O PORT OVER 5 YR  2/14/2023       Family History:  Family History   Problem Relation Age of Onset    Hypertension Mother     Stroke Mother     Heart Disease Mother     Hypertension Father     Stroke Father        Social History:  Social History     Tobacco Use    Smoking status: Every Day     Packs/day: 0.50     Years: 18.00     Pack years: 9.00     Types: Cigarettes    Smokeless tobacco: Never   Vaping Use    Vaping Use: Never used   Substance Use Topics    Alcohol use: Never    Drug use: Never       Allergies: Allergies   Allergen Reactions    Dexamethasone Other (comments)     Edema         Medications:  Current Facility-Administered Medications   Medication Dose Route Frequency Provider Last Rate Last Admin    heparin (porcine) 1,000 unit/mL injection              Current Outpatient Medications   Medication Sig Dispense Refill    benzonatate (TESSALON) 100 mg capsule Take 1 Capsule by mouth three (3) times daily as needed for Cough for up to 10 days. 30 Capsule 0    carvediloL (COREG) 12.5 mg tablet Take 1 Tablet by mouth two (2) times a day. 180 Tablet 2    hydrALAZINE (APRESOLINE) 25 mg tablet Take 1 Tablet by mouth Every morning. 90 Tablet 2    NIFEdipine ER (ADALAT CC) 60 mg ER tablet Take 1 Tablet by mouth every twelve (12) hours. 180 Tablet 2    cloNIDine (CATAPRES) 0.2 mg/24 hr ptwk 1 Patch by TransDERmal route every seven (7) days. 12 Patch 2    sertraline (ZOLOFT) 25 mg tablet Take 1 Tablet by mouth daily.  90 Tablet 2    pravastatin (PRAVACHOL) 40 mg tablet Take 1 Tablet by mouth nightly. Indications: high cholesterol 90 Tablet 2    ondansetron (ZOFRAN ODT) 4 mg disintegrating tablet Take 1 Tablet by mouth two (2) times daily as needed for Nausea or Vomiting. 30 Tablet 2    loratadine (CLARITIN) 10 mg tablet Take 1 Tablet by mouth daily as needed for Allergies. 90 Tablet 1    albuterol (PROVENTIL HFA, VENTOLIN HFA, PROAIR HFA) 90 mcg/actuation inhaler Take 1 Puff by inhalation every six (6) hours as needed for Wheezing. 18 g 2    famotidine (PEPCID) 40 mg tablet Take 1 Tablet by mouth daily. 90 Tablet 1    acetaminophen (Tylenol 8 Hour) 650 mg TbER Take 1 Tablet by mouth two (2) times daily as needed for Pain. 90 Tablet 1    Col-Rite 100 mg capsule take 1 capsule by mouth twice a day for 90 DAYS 60 Capsule 2    clobetasoL (TEMOVATE) 0.05 % ointment Apply  to affected area two (2) times a day.      gabapentin (NEURONTIN) 100 mg capsule Take 100 mg by mouth nightly. methocarbamoL (ROBAXIN) 750 mg tablet Take 2 Tabs by mouth as needed. Social Determinants of Health:  Social Determinants of Health     Tobacco Use: High Risk    Smoking Tobacco Use: Every Day    Smokeless Tobacco Use: Never    Passive Exposure: Not on file   Alcohol Use: Unknown    Frequency of Alcohol Consumption: Not on file    Average Number of Drinks: Not on file    Frequency of Binge Drinking: Never   Financial Resource Strain: Low Risk     Difficulty of Paying Living Expenses: Not hard at all   Food Insecurity: No Food Insecurity    Worried About Running Out of Food in the Last Year: Never true    Ran Out of Food in the Last Year: Never true   Transportation Needs: No Transportation Needs    Lack of Transportation (Medical): No    Lack of Transportation (Non-Medical): No   Physical Activity: Insufficiently Active    Days of Exercise per Week: 1 day    Minutes of Exercise per Session: 20 min   Stress: Not on file   Social Connections:  Moderately Integrated    Frequency of Communication with Friends and Family: Three times a week    Frequency of Social Gatherings with Friends and Family: Three times a week    Attends Scientologist Services: More than 4 times per year    Active Member of Clubs or Organizations: No    Attends Club or Organization Meetings: Never    Marital Status:    Intimate Partner Violence: Not At Risk    Fear of Current or Ex-Partner: No    Emotionally Abused: No    Physically Abused: No    Sexually Abused: No   Depression: Not at risk    PHQ-2 Score: 1   Housing Stability: Low Risk     Unable to Pay for Housing in the Last Year: No    Number of [place] in the Last Year: 1    Unstable Housing in the Last Year: No     Good access to primary and/or specialist care. Reports generally stable financial, home and living environment. Review of Systems     Review of Systems   Constitutional:  Negative for chills and fever. HENT:  Negative for congestion, rhinorrhea, sinus pressure and sneezing. Eyes:  Negative for visual disturbance. Respiratory:  Negative for cough and shortness of breath. Cardiovascular:  Negative for chest pain. Gastrointestinal:  Negative for abdominal pain, diarrhea, nausea and vomiting. Genitourinary:  Negative for dysuria, frequency, urgency, vaginal bleeding and vaginal discharge. Musculoskeletal:  Negative for back pain and neck pain. Skin:  Negative for rash. Neurological:  Negative for syncope, numbness and headaches. Physical Exam     Physical Exam  Vitals and nursing note reviewed. Constitutional:       General: She is not in acute distress. Appearance: Normal appearance. HENT:      Head: Normocephalic and atraumatic. Right Ear: External ear normal.      Left Ear: External ear normal.      Nose: Nose normal. No congestion. Mouth/Throat:      Mouth: Mucous membranes are moist.      Pharynx: Oropharynx is clear. No oropharyngeal exudate.    Eyes:      Conjunctiva/sclera: Conjunctivae normal.   Cardiovascular: Rate and Rhythm: Normal rate and regular rhythm. Pulses: Normal pulses. Heart sounds: Normal heart sounds. No murmur heard. Comments: HD catheter to the left upper chest, there is some dried blood on the catheter itself but there is no current active bleeding there is no induration erythema or purulence  Pulmonary:      Effort: Pulmonary effort is normal.      Breath sounds: Normal breath sounds. No wheezing, rhonchi or rales. Abdominal:      General: Abdomen is flat. Tenderness: There is no abdominal tenderness. There is no guarding or rebound. Musculoskeletal:         General: No swelling or tenderness. Normal range of motion. Cervical back: Normal range of motion and neck supple. Right lower leg: No edema. Left lower leg: No edema. Skin:     General: Skin is warm and dry. Capillary Refill: Capillary refill takes less than 2 seconds. Findings: No rash. Neurological:      General: No focal deficit present. Mental Status: She is alert. Lab and Diagnostic Study Results     Labs -  Recent Results (from the past 24 hour(s))   METABOLIC PANEL, COMPREHENSIVE    Collection Time: 02/18/23  1:47 PM   Result Value Ref Range    Sodium 135 (L) 136 - 145 mmol/L    Potassium 3.5 3.5 - 5.1 mmol/L    Chloride 103 97 - 108 mmol/L    CO2 28 21 - 32 mmol/L    Anion gap 4 (L) 5 - 15 mmol/L    Glucose 75 65 - 100 mg/dL    BUN 15 6 - 20 mg/dL    Creatinine 2.91 (H) 0.55 - 1.02 mg/dL    BUN/Creatinine ratio 5 (L) 12 - 20      eGFR 17 (L) >60 ml/min/1.73m2    Calcium 9.3 8.5 - 10.1 mg/dL    Bilirubin, total 0.7 0.2 - 1.0 mg/dL    AST (SGOT) 17 15 - 37 U/L    ALT (SGPT) 21 12 - 78 U/L    Alk.  phosphatase 91 45 - 117 U/L    Protein, total 8.1 6.4 - 8.2 g/dL    Albumin 3.9 3.5 - 5.0 g/dL    Globulin 4.2 (H) 2.0 - 4.0 g/dL    A-G Ratio 0.9 (L) 1.1 - 2.2     CBC WITH AUTOMATED DIFF    Collection Time: 02/18/23  2:00 PM   Result Value Ref Range    WBC 6.6 3.6 - 11.0 K/uL    RBC 3. 94 3.80 - 5.20 M/uL    HGB 11.2 (L) 11.5 - 16.0 g/dL    HCT 34.5 (L) 35.0 - 47.0 %    MCV 87.6 80.0 - 99.0 FL    MCH 28.4 26.0 - 34.0 PG    MCHC 32.5 30.0 - 36.5 g/dL    RDW 15.9 (H) 11.5 - 14.5 %    PLATELET 507 (L) 188 - 400 K/uL    MPV 11.9 8.9 - 12.9 FL    NRBC 0.0 0.0  WBC    ABSOLUTE NRBC 0.00 0.00 - 0.01 K/uL    NEUTROPHILS 68 32 - 75 %    LYMPHOCYTES 21 12 - 49 %    MONOCYTES 6 5 - 13 %    EOSINOPHILS 4 0 - 7 %    BASOPHILS 1 0 - 1 %    IMMATURE GRANULOCYTES 0 0 - 0.5 %    ABS. NEUTROPHILS 4.5 1.8 - 8.0 K/UL    ABS. LYMPHOCYTES 1.4 0.8 - 3.5 K/UL    ABS. MONOCYTES 0.4 0.0 - 1.0 K/UL    ABS. EOSINOPHILS 0.3 0.0 - 0.4 K/UL    ABS. BASOPHILS 0.0 0.0 - 0.1 K/UL    ABS. IMM. GRANS. 0.0 0.00 - 0.04 K/UL    DF AUTOMATED     PROTHROMBIN TIME + INR    Collection Time: 02/18/23  2:00 PM   Result Value Ref Range    Prothrombin time 13.8 11.9 - 14.6 sec    INR 1.1 0.9 - 1.1     PTT    Collection Time: 02/18/23  2:00 PM   Result Value Ref Range    aPTT 38.2 (H) 21.2 - 34.1 sec    aPTT, therapeutic range   sec         Radiologic Studies -   No orders to display       Please see the full medical record for comprehensive list of labs and imaging obtained during the visit. All labs and imaging studies were personally reviewed. My intepretation(s) if applicable are below:     EKG interpretation(s): N/a    Xray Interpretations(s): N/a        Medical Decision Making and ED Course   - I am the first and primary provider for this patient AND AM THE PRIMARY PROVIDER OF RECORD. - I reviewed the vital signs, available nursing notes, past medical history, past surgical history, family history and social history. - Initial assessment performed. The patients presenting problems have been discussed, and the staff are in agreement with the care plan formulated and outlined with them. I have encouraged them to ask questions as they arise throughout their visit.     Vital Signs-Reviewed the patient's vital signs. Patient Vitals for the past 24 hrs:   Temp Pulse Resp BP SpO2   02/18/23 1527 -- 78 19 (!) 171/90 100 %   02/18/23 1300 -- -- -- (!) 170/102 --   02/18/23 1230 -- -- -- (!) 169/101 --   02/18/23 1200 -- -- -- (!) 172/73 --   02/18/23 1130 -- -- -- (!) 166/79 --   02/18/23 1100 -- -- -- (!) 156/72 --   02/18/23 1030 98 °F (36.7 °C) -- -- (!) 146/71 --   02/18/23 0746 97.7 °F (36.5 °C) (!) 59 15 124/75 100 %       Records Reviewed: Prior medical records, Previous Radiology studies, Previous Laboratory studies, Previous EKGs, and Nursing notes    Additional Considerations:    None    Provider Notes (Medical Decision Making):     MDM  Number of Diagnoses or Management Options  ESRD (end stage renal disease) ([facility] [address]  Leakage of vascular dialysis catheter, initial encounter [facility])  Diagnosis management comments: 78-year-old female presenting for issues with dialysis catheter    Ddx: Displaced dialysis catheter, loose dialysis catheter, hyperkalemia, end-stage renal disease, etc.    Plan will be to have a dialysis nurse come down and take a look at the catheter also. We will contact her nephrologist for recommendations. We will check some labs to assess for hyperkalemia    Patient went to dialysis, upon return from dialysis there is a noted bleeding during dialysis but is not currently bleeding. Spoke to the patient's nephrologist who recommended discharge and follow-up with vascular surgery. Advised me to have patient go back to her dialysis as scheduled on Tuesday. ED Course:         ED Course as of 02/18/23 1628   Sat Feb 18, 2023   6300 Reportedly a transporter was dispatched to take the patient to dialysis. I do not see any notes from nephrology or orders  [TIMUR]   1400 D/w Dr Kunal Reese; recommended follow-up with vascular surgery as planned. Would plan to do regular dialysis treatment as scheduled on Tuesday. Patient returned from dialysis, not currently bleeding.  [TIMUR]   8141 Labs reflect mild anemia, near her baseline, CMP reflects mild hyponatremia, consistent with dialysis status. [TIMUR]      ED Course User Index  [TIMUR] Bran Major DO     Advised patient to return at any time if worsening or significant bleeding occurs. She will follow-up with vascular surgery, will give her additional resources. Advised follow-up with nephrology as well and plan to dialysis as scheduled. This appears to be an acute condition.  ------------------------------------------------------------------------------------------------------------    At this time, patient is stable and appropriate for discharge home. Patient demonstrates understanding of current diagnoses and is in agreement with the treatment plan. They are advised that while the likelihood of serious underlying condition is low at this point given the evaluation performed today, we cannot fully rule it out. They are advised to immediately return with any new symptoms or worsening of current condition. Any Incidental findings were noted on the patient's discharge paperwork as well as verbally directly to the patient, and the appropriate follow-up was given to the patient as far as instructions on testing needed as well as the timeframe. All questions have been answered. Patient is given educational material regarding their diagnoses, including danger symptoms and when to return to the ED. This note was dictated utilizing Dragon voice recognition software. Unfortunately this leads to occasional typographical errors. I apologize in advance if the situation occurs. If questions occur please do not hesitate to contact me directly. Patrick Jose DO        Consultations:       Consultations: -  Nephrology -- Dr Ashly Barnett    See the ED course section, if applicable for details on the content of consultations requested.     Procedures and Critical Care       Performed by: Patrick Jose DO    Procedures             CRITICAL CARE NOTE :  9:41 AM  CRITICAL CARE TIME: N/a    Brittanie Peoples, DO        Disposition         Diagnosis:   1. Leakage of vascular dialysis catheter, initial encounter ([facility] [address]    2. ESRD (end stage renal disease) ([facility] [address]          Disposition: Discharge    Follow-up Information       Follow up With Specialties Details Why Contact Info    Shakila Locke MD Vascular Surgery Call today      [facility] Department Of Vascular Surgery  Today  Thanh Moreira  [phone]    Joslyn Mendez MD General and Vascular Surgery Call today Additional Vascular Specialist for Follow Up [address] [place]  [address] [facility] 06 [phone]      [facility] EMERGENCY DEPT Emergency Medicine  As needed, If symptoms worsen; or [facility] Emergency Department [address] [facility] Bhaskar Malcolm MD Nephrology Call today  [address] [facility]  [phone]              Discharge Medication List as of 2/18/2023  3:18 PM        CONTINUE these medications which have NOT CHANGED    Details   benzonatate (TESSALON) 100 mg capsule Take 1 Capsule by mouth three (3) times daily as needed for Cough for up to 10 days. , Normal, Disp-30 Capsule, R-0      carvediloL (COREG) 12.5 mg tablet Take 1 Tablet by mouth two (2) times a day., Normal, Disp-180 Tablet, R-2      hydrALAZINE (APRESOLINE) 25 mg tablet Take 1 Tablet by mouth Every morning., Normal, Disp-90 Tablet, R-2      NIFEdipine ER (ADALAT CC) 60 mg ER tablet Take 1 Tablet by mouth every twelve (12) hours. , Normal, Disp-180 Tablet, R-2      cloNIDine (CATAPRES) 0.2 mg/24 hr ptwk 1 Patch by TransDERmal route every seven (7) days. , Normal, Disp-12 Patch, R-2      sertraline (ZOLOFT) 25 mg tablet Take 1 Tablet by mouth daily. , Normal, Disp-90 Tablet, R-2      pravastatin (PRAVACHOL) 40 mg tablet Take 1 Tablet by mouth nightly.  Indications: high cholesterol, Normal, Disp-90 Tablet, R-2      ondansetron (ZOFRAN ODT) 4 mg disintegrating tablet Take 1 Tablet by mouth two (2) times daily as needed for Nausea or Vomiting., Normal, Disp-30 Tablet, R-2      loratadine (CLARITIN) 10 mg tablet Take 1 Tablet by mouth daily as needed for Allergies. , Normal, Disp-90 Tablet, R-1      albuterol (PROVENTIL HFA, VENTOLIN HFA, PROAIR HFA) 90 mcg/actuation inhaler Take 1 Puff by inhalation every six (6) hours as needed for Wheezing., Normal, Disp-18 g, R-2      famotidine (PEPCID) 40 mg tablet Take 1 Tablet by mouth daily. , Normal, Disp-90 Tablet, R-1      acetaminophen (Tylenol 8 Hour) 650 mg TbER Take 1 Tablet by mouth two (2) times daily as needed for Pain., Normal, Disp-90 Tablet, R-1      Col-Rite 100 mg capsule take 1 capsule by mouth twice a day for 90 DAYS, Normal, Disp-60 Capsule, R-2      clobetasoL (TEMOVATE) 0.05 % ointment Apply  to affected area two (2) times a day., Historical Med      gabapentin (NEURONTIN) 100 mg capsule Take 100 mg by mouth nightly., Historical Med      methocarbamoL (ROBAXIN) 750 mg tablet Take 2 Tabs by mouth as needed., Historical Med               Diagnosis     Clinical Impression:   1. Leakage of vascular dialysis catheter, initial encounter ([facility] [address]    2. ESRD (end stage renal disease) ([facility] [address]        Attestations:    Jay Palmer, DO    Please note that this dictation was completed with [employer], the computer voice recognition software. Quite often unanticipated grammatical, syntax, homophones, and other interpretive errors are inadvertently transcribed by the computer software. Please disregard these errors. Please excuse any errors that have escaped final proofreading. Thank you.

## 2023-02-18 NOTE — DIALYSIS
Tx initiated via a patent right cath. Dressing noted to be bloody upon assessment. Dressing changed per protocol. Bleeding still noted at end of tx. 1Kg was removed and dialyzed 3 hours.  Orders reviewed with Dr. Martha Cade.  HBsAG negative as of 2/14/23

## 2023-02-19 ENCOUNTER — APPOINTMENT (OUTPATIENT)
Dept: GENERAL RADIOLOGY | Age: 73
End: 2023-02-19
Attending: NURSE PRACTITIONER
Payer: MEDICARE

## 2023-02-19 ENCOUNTER — HOSPITAL ENCOUNTER (EMERGENCY)
Age: 73
Discharge: HOME | End: 2023-02-19
Attending: EMERGENCY MEDICINE
Payer: MEDICARE

## 2023-02-19 VITALS
RESPIRATION RATE: 18 BRPM | WEIGHT: 107.81 LBS | OXYGEN SATURATION: 100 % | HEART RATE: 65 BPM | BODY MASS INDEX: 19.84 KG/M2 | DIASTOLIC BLOOD PRESSURE: 80 MMHG | TEMPERATURE: 97.8 F | SYSTOLIC BLOOD PRESSURE: 135 MMHG | HEIGHT: 62 IN

## 2023-02-19 DIAGNOSIS — Z78.9 PROBLEM WITH VASCULAR ACCESS: Primary | ICD-10-CM

## 2023-02-19 LAB
ANION GAP SERPL CALC-SCNC: 3 MMOL/L (ref 5–15)
APTT PPP: 32.1 SEC (ref 21.2–34.1)
BASOPHILS # BLD: 0 K/UL (ref 0–0.1)
BASOPHILS NFR BLD: 1 % (ref 0–1)
BUN SERPL-MCNC: 31 MG/DL (ref 6–20)
BUN/CREAT SERPL: 6 (ref 12–20)
CA-I BLD-MCNC: 10.5 MG/DL (ref 8.5–10.1)
CHLORIDE SERPL-SCNC: 107 MMOL/L (ref 97–108)
CO2 SERPL-SCNC: 28 MMOL/L (ref 21–32)
CREAT SERPL-MCNC: 5.53 MG/DL (ref 0.55–1.02)
DIFFERENTIAL METHOD BLD: ABNORMAL
EOSINOPHIL # BLD: 0.3 K/UL (ref 0–0.4)
EOSINOPHIL NFR BLD: 5 % (ref 0–7)
ERYTHROCYTE [DISTWIDTH] IN BLOOD BY AUTOMATED COUNT: 16.1 % (ref 11.5–14.5)
GLUCOSE SERPL-MCNC: 91 MG/DL (ref 65–100)
HCT VFR BLD AUTO: 37 % (ref 35–47)
HGB BLD-MCNC: 11.6 G/DL (ref 11.5–16)
IMM GRANULOCYTES # BLD AUTO: 0 K/UL (ref 0–0.04)
IMM GRANULOCYTES NFR BLD AUTO: 1 % (ref 0–0.5)
INR PPP: 1.1 (ref 0.9–1.1)
LYMPHOCYTES # BLD: 1.2 K/UL (ref 0.8–3.5)
LYMPHOCYTES NFR BLD: 18 % (ref 12–49)
MCH RBC QN AUTO: 27.8 PG (ref 26–34)
MCHC RBC AUTO-ENTMCNC: 31.4 G/DL (ref 30–36.5)
MCV RBC AUTO: 88.5 FL (ref 80–99)
MONOCYTES # BLD: 0.4 K/UL (ref 0–1)
MONOCYTES NFR BLD: 6 % (ref 5–13)
NEUTS SEG # BLD: 4.6 K/UL (ref 1.8–8)
NEUTS SEG NFR BLD: 69 % (ref 32–75)
NRBC # BLD: 0 K/UL (ref 0–0.01)
NRBC BLD-RTO: 0 PER 100 WBC
PLATELET # BLD AUTO: 98 K/UL (ref 150–400)
PMV BLD AUTO: 10.9 FL (ref 8.9–12.9)
POTASSIUM SERPL-SCNC: 4.1 MMOL/L (ref 3.5–5.1)
PROTHROMBIN TIME: 13.7 SEC (ref 11.9–14.6)
RBC # BLD AUTO: 4.18 M/UL (ref 3.8–5.2)
SODIUM SERPL-SCNC: 138 MMOL/L (ref 136–145)
THERAPEUTIC RANGE,PTTT: NORMAL SEC (ref 82–109)
WBC # BLD AUTO: 6.5 K/UL (ref 3.6–11)

## 2023-02-19 PROCEDURE — 85025 COMPLETE CBC W/AUTO DIFF WBC: CPT

## 2023-02-19 PROCEDURE — 80048 BASIC METABOLIC PNL TOTAL CA: CPT

## 2023-02-19 PROCEDURE — 85730 THROMBOPLASTIN TIME PARTIAL: CPT

## 2023-02-19 PROCEDURE — 36415 COLL VENOUS BLD VENIPUNCTURE: CPT

## 2023-02-19 PROCEDURE — 71045 X-RAY EXAM CHEST 1 VIEW: CPT

## 2023-02-19 PROCEDURE — 85610 PROTHROMBIN TIME: CPT

## 2023-02-19 PROCEDURE — 99284 EMERGENCY DEPT VISIT MOD MDM: CPT

## 2023-02-19 NOTE — ED PROVIDER NOTES
[facility] EMERGENCY DEPT  EMERGENCY DEPARTMENT HISTORY AND PHYSICAL EXAM      Date: 2/19/2023  Patient Name: Elizabeth Bro  MRN: [id number]  YOB: 1950  Date of evaluation: 2/19/2023  Provider: Viry Lozano NP   Note Started: 3:50 PM 2/19/23    HISTORY OF PRESENT ILLNESS     Chief Complaint   Patient presents with    Vascular Access Problem       History Provided By: Patient and Patient's Daughter    HPI: Elizabeth rBo, 71 y.o. female pt had dialysis central line placed on Monday. Continues to ooze and bleed through dressings. Yesterday seen here as [facility] would not use the line. Pt seen here, had dialysis and and dressing change. Pain at side. No redness.  Minimal bleeding thru dressing that is intact from visit yesterday    PAST MEDICAL HISTORY   Past Medical History:  Past Medical History:   Diagnosis Date    Abdominal aortic aneurysm (AAA) 9/23/2020    Asthmatic bronchitis 9/23/2020    Azotemia 9/23/2020    Benign paroxysmal positional vertigo 9/23/2020    Contact dermatitis 9/23/2020    Degenerative joint disease involving multiple joints 9/23/2020    Edema 9/23/2020    Essential hypertension 9/23/2020    Fibromyalgia 9/23/2020    Fibromyositis 9/23/2020    Hyperlipidemia 9/23/2020    Idiopathic peripheral neuropathy 9/23/2020    Insomnia 9/23/2020    Irritable bowel syndrome 9/23/2020    Low back pain 9/23/2020    Reactive depression 9/23/2020    Tobacco dependence syndrome 9/23/2020    URI (upper respiratory infection) 9/23/2020       Past Surgical History:  Past Surgical History:   Procedure Laterality Date    HX CHOLECYSTECTOMY  1996    HX HYSTERECTOMY  1995    HX KNEE REPLACEMENT  1980    HX ORTHOPAEDIC Bilateral 2000    carpal tunnel release    HX ORTHOPAEDIC  1996    Right thumb surgery    HX PARATHYROIDECTOMY  1985    IR INSERT TUNL CVC W/O PORT OVER 5 YR  2/14/2023       Family History:  Family History   Problem Relation Age of Onset    Hypertension Mother     Stroke Mother Heart Disease Mother     Hypertension Father     Stroke Father        Social History:  Social History     Tobacco Use    Smoking status: Every Day     Packs/day: 0.50     Years: 18.00     Pack years: 9.00     Types: Cigarettes    Smokeless tobacco: Never   Vaping Use    Vaping Use: Never used   Substance Use Topics    Alcohol use: Never    Drug use: Never       Allergies: Allergies   Allergen Reactions    Dexamethasone Other (comments)     Edema         PCP: Nino Anne MD    Current Meds:   Discharge Medication List as of 2/19/2023  3:53 PM        CONTINUE these medications which have NOT CHANGED    Details   benzonatate (TESSALON) 100 mg capsule Take 1 Capsule by mouth three (3) times daily as needed for Cough for up to 10 days. , Normal, Disp-30 Capsule, R-0      carvediloL (COREG) 12.5 mg tablet Take 1 Tablet by mouth two (2) times a day., Normal, Disp-180 Tablet, R-2      hydrALAZINE (APRESOLINE) 25 mg tablet Take 1 Tablet by mouth Every morning., Normal, Disp-90 Tablet, R-2      NIFEdipine ER (ADALAT CC) 60 mg ER tablet Take 1 Tablet by mouth every twelve (12) hours. , Normal, Disp-180 Tablet, R-2      cloNIDine (CATAPRES) 0.2 mg/24 hr ptwk 1 Patch by TransDERmal route every seven (7) days. , Normal, Disp-12 Patch, R-2      sertraline (ZOLOFT) 25 mg tablet Take 1 Tablet by mouth daily. , Normal, Disp-90 Tablet, R-2      pravastatin (PRAVACHOL) 40 mg tablet Take 1 Tablet by mouth nightly. Indications: high cholesterol, Normal, Disp-90 Tablet, R-2      ondansetron (ZOFRAN ODT) 4 mg disintegrating tablet Take 1 Tablet by mouth two (2) times daily as needed for Nausea or Vomiting., Normal, Disp-30 Tablet, R-2      loratadine (CLARITIN) 10 mg tablet Take 1 Tablet by mouth daily as needed for Allergies. , Normal, Disp-90 Tablet, R-1      albuterol (PROVENTIL HFA, VENTOLIN HFA, PROAIR HFA) 90 mcg/actuation inhaler Take 1 Puff by inhalation every six (6) hours as needed for Wheezing., Normal, Disp-18 g, R-2 famotidine (PEPCID) 40 mg tablet Take 1 Tablet by mouth daily. , Normal, Disp-90 Tablet, R-1      acetaminophen (Tylenol 8 Hour) 650 mg TbER Take 1 Tablet by mouth two (2) times daily as needed for Pain., Normal, Disp-90 Tablet, R-1      Col-Rite 100 mg capsule take 1 capsule by mouth twice a day for 90 DAYS, Normal, Disp-60 Capsule, R-2      clobetasoL (TEMOVATE) 0.05 % ointment Apply  to affected area two (2) times a day., Historical Med      gabapentin (NEURONTIN) 100 mg capsule Take 100 mg by mouth nightly., Historical Med      methocarbamoL (ROBAXIN) 750 mg tablet Take 2 Tabs by mouth as needed., Historical Med             REVIEW OF SYSTEMS   Review of Systems   Constitutional: Negative. Negative for fever. HENT: Negative. Respiratory: Negative. Negative for chest tightness and shortness of breath. Cardiovascular: Negative. Negative for chest pain. Gastrointestinal: Negative. Negative for abdominal pain. Genitourinary: Negative. Negative for dysuria. Musculoskeletal: Negative. Negative for back pain. Skin: Negative. Neurological: Negative. Negative for dizziness, light-headedness and numbness. Hematological:  Bruises/bleeds easily (ongoing bleeding from site to left chest at insertion site to the central line. ). Psychiatric/Behavioral:  The patient is nervous/anxious. All other systems reviewed and are negative. Positives and Pertinent negatives as per HPI. PHYSICAL EXAM     ED Triage Vitals [02/19/23 1254]   ED Encounter Vitals Group      /80      Pulse (Heart Rate) 65      Resp Rate 18      Temp 97.8 °F (36.6 °C)      Temp src       O2 Sat (%) 100 %      Weight 107 lb 12.9 oz      Height 5' 2\"      Physical Exam  Vitals and nursing note reviewed. Constitutional:       General: She is not in acute distress. Appearance: Normal appearance. She is normal weight. HENT:      Head: Normocephalic and atraumatic.    Eyes:      Extraocular Movements: Extraocular movements intact. Pupils: Pupils are equal, round, and reactive to light. Cardiovascular:      Rate and Rhythm: Normal rate and regular rhythm. Pulses: Normal pulses. Heart sounds: Normal heart sounds. Pulmonary:      Effort: Pulmonary effort is normal.      Breath sounds: Normal breath sounds. Abdominal:      General: Abdomen is flat. Palpations: Abdomen is soft. Musculoskeletal:         General: Normal range of motion. Cervical back: Normal range of motion. Tenderness (left side of neck from IJ) present. Skin:     General: Skin is warm and dry. Capillary Refill: Capillary refill takes less than 2 seconds. Findings: Bruising (Left chest wall from Central line placement) present. No erythema. Comments: Dressing removed and noted old blood inside of the dressing. No active bleeding from site. Wound care provider and new dressing applied. Placed quick clot incase bleeding resumed and dressing will be redone tomorrow at dialysis. Daughter given kit to change dressing if needed. (Pt daughter is nursing also. )   Neurological:      General: No focal deficit present. Mental Status: She is alert and oriented to person, place, and time.    Psychiatric:         Mood and Affect: Mood normal.         Behavior: Behavior normal.       SCREENINGS             none  No data recorded      LAB, EKG AND DIAGNOSTIC RESULTS   Labs:  Recent Results (from the past 12 hour(s))   CBC WITH AUTOMATED DIFF    Collection Time: 02/19/23  2:06 PM   Result Value Ref Range    WBC 6.5 3.6 - 11.0 K/uL    RBC 4.18 3.80 - 5.20 M/uL    HGB 11.6 11.5 - 16.0 g/dL    HCT 37.0 35.0 - 47.0 %    MCV 88.5 80.0 - 99.0 FL    MCH 27.8 26.0 - 34.0 PG    MCHC 31.4 30.0 - 36.5 g/dL    RDW 16.1 (H) 11.5 - 14.5 %    PLATELET 98 (L) 536 - 400 K/uL    MPV 10.9 8.9 - 12.9 FL    NRBC 0.0 0.0  WBC    ABSOLUTE NRBC 0.00 0.00 - 0.01 K/uL    NEUTROPHILS 69 32 - 75 %    LYMPHOCYTES 18 12 - 49 %    MONOCYTES 6 5 - 13 %    EOSINOPHILS 5 0 - 7 %    BASOPHILS 1 0 - 1 %    IMMATURE GRANULOCYTES 1 (H) 0 - 0.5 %    ABS. NEUTROPHILS 4.6 1.8 - 8.0 K/UL    ABS. LYMPHOCYTES 1.2 0.8 - 3.5 K/UL    ABS. MONOCYTES 0.4 0.0 - 1.0 K/UL    ABS. EOSINOPHILS 0.3 0.0 - 0.4 K/UL    ABS. BASOPHILS 0.0 0.0 - 0.1 K/UL    ABS. IMM. GRANS. 0.0 0.00 - 0.04 K/UL    DF AUTOMATED     METABOLIC PANEL, BASIC    Collection Time: 02/19/23  2:06 PM   Result Value Ref Range    Sodium 138 136 - 145 mmol/L    Potassium 4.1 3.5 - 5.1 mmol/L    Chloride 107 97 - 108 mmol/L    CO2 28 21 - 32 mmol/L    Anion gap 3 (L) 5 - 15 mmol/L    Glucose 91 65 - 100 mg/dL    BUN 31 (H) 6 - 20 mg/dL    Creatinine 5.53 (H) 0.55 - 1.02 mg/dL    BUN/Creatinine ratio 6 (L) 12 - 20      eGFR 8 (L) >60 ml/min/1.73m2    Calcium 10.5 (H) 8.5 - 10.1 mg/dL   PROTHROMBIN TIME + INR    Collection Time: 02/19/23  2:06 PM   Result Value Ref Range    Prothrombin time 13.7 11.9 - 14.6 sec    INR 1.1 0.9 - 1.1     PTT    Collection Time: 02/19/23  2:06 PM   Result Value Ref Range    aPTT 32.1 21.2 - 34.1 sec    aPTT, therapeutic range   82 - 109 sec     Labs reviewed with patient. And family. . Pt labs are essentially normal and do not require any medical intervention. Radiologic Studies:  Non-plain film images such as CT, Ultrasound and MRI are read by the radiologist. Plain radiographic images are visualized and preliminarily interpreted by the ED Provider with the below findings:        Interpretation per the Radiologist below, if available at the time of this note:  XR CHEST PORT    Result Date: 2/19/2023  PORTABLE CHEST RADIOGRAPH/S: 2/19/2023 3:06 PM INDICATION: Vascular access. COMPARISON: 2/14/2023, 12/8/2021. TECHNIQUE: Portable frontal upright radiograph/s of the chest. FINDINGS: A left IJ hemodialysis catheter terminates in the cavoatrial junction. No pneumothorax; the catheter obscures a small portion of the superior-most apex. The lungs are clear.  The central airways are patent. No pleural effusion. Cholecystectomy clips in the right upper quadrant. Clear lungs. PROCEDURES   Unless otherwise noted below, none. Performed by: María Hillman NP   Procedures      CRITICAL CARE TIME   none    ED COURSE and DIFFERENTIAL DIAGNOSIS/MDM   Vitals:    Vitals:    02/19/23 1254   BP: 135/80   Pulse: 65   Resp: 18   Temp: 97.8 °F (36.6 °C)   SpO2: 100%   Weight: 48.9 kg (107 lb 12.9 oz)   Height: 5' 2\" (1.575 m)        Patient was given the following medications:  Medications - No data to display    CONSULTS: (Who and What was discussed)  None     Chronic Conditions: ESRD,   Social Determinants affecting Dx or Tx: None  Counseling: none     Records Reviewed (source and summary of external notes): Prior medical records, Previous Radiology studies, and Previous Laboratory studies    CC/HPI Summary, DDx, ED Course, and Reassessment: bleeding from vascular site. 1.abnormal platelets  2.heparin side effect  3. Altered coagulation    Repeat labs. Dressing change. Monitored for active bleeding . Disposition Considerations (Tests not done, Shared Decision Making, Pt Expectation of Test or Treatment.): none     FINAL IMPRESSION     1. Problem with vascular access          DISPOSITION/PLAN   Discharged    Discharge Note: The patient is stable for discharge home. The signs, symptoms, diagnosis, and discharge instructions have been discussed, understanding conveyed, and agreed upon. The patient is to follow up as recommended or return to ER should their symptoms worsen.       PATIENT REFERRED TO:  Follow-up Information       Follow up With Specialties Details Why Contact Info    Tal Hines MD Internal Medicine Physician In 2 days  [address] [place] [address]      Caryle Dade, MD Nephrology In 1 day  [facility]  [phone]                DISCHARGE MEDICATIONS:  Discharge Medication List as of 2/19/2023  3:53 PM            DISCONTINUED MEDICATIONS:  Discharge Medication List as of 2/19/2023  3:53 PM          I am the Primary Clinician of Record: Maritza Evans NP (electronically signed)    (Please note that parts of this dictation were completed with voice recognition software. Quite often unanticipated grammatical, syntax, homophones, and other interpretive errors are inadvertently transcribed by the computer software. Please disregards these errors.  Please excuse any errors that have escaped final proofreading.)

## 2023-02-19 NOTE — ED TRIAGE NOTES
Pt states she had a temporary port placed for dialysis due to her fistula not working however the port has been bleeding since it was placed. States the port was placed at this facility last Monday 2/13. [facility] refused to use her port yesterday due to the bleeding however states she was dialyzed at this facility yesterday. Complains of pain to the port. State she has not had any blood thinners since December.

## 2023-02-28 PROBLEM — Z11.59 NEED FOR HEPATITIS C SCREENING TEST: Status: RESOLVED | Noted: 2023-01-29 | Resolved: 2023-02-28

## 2023-03-05 PROBLEM — Z12.31 SCREENING MAMMOGRAM FOR BREAST CANCER: Status: RESOLVED | Noted: 2022-08-30 | Resolved: 2023-03-05

## 2023-03-06 RX ORDER — BENZONATATE 100 MG/1
CAPSULE ORAL
Qty: 30 CAPSULE | Refills: 0 | Status: SHIPPED | OUTPATIENT
Start: 2023-03-06

## 2023-05-09 PROBLEM — Z12.11 SCREENING FOR COLON CANCER: Status: ACTIVE | Noted: 2022-08-30

## 2023-05-09 PROBLEM — I10 ELEVATED BLOOD PRESSURE READING IN OFFICE WITH DIAGNOSIS OF HYPERTENSION: Status: RESOLVED | Noted: 2022-11-30 | Resolved: 2023-05-09

## 2023-05-09 PROBLEM — N18.4 CKD (CHRONIC KIDNEY DISEASE) STAGE 4, GFR 15-29 ML/MIN (HCC): Status: RESOLVED | Noted: 2022-05-10 | Resolved: 2023-05-09

## 2023-05-09 PROBLEM — E78.5 HYPERLIPIDEMIA: Status: RESOLVED | Noted: 2020-09-23 | Resolved: 2023-05-09

## 2023-05-09 PROBLEM — N18.30 CHRONIC RENAL DISEASE, STAGE III (HCC): Status: RESOLVED | Noted: 2022-05-10 | Resolved: 2023-05-09

## 2023-05-10 ENCOUNTER — OFFICE VISIT (OUTPATIENT)
Facility: CLINIC | Age: 73
End: 2023-05-10
Payer: MEDICARE

## 2023-05-10 VITALS
WEIGHT: 106.4 LBS | SYSTOLIC BLOOD PRESSURE: 122 MMHG | RESPIRATION RATE: 18 BRPM | TEMPERATURE: 98.1 F | DIASTOLIC BLOOD PRESSURE: 64 MMHG | BODY MASS INDEX: 19.58 KG/M2 | OXYGEN SATURATION: 100 % | HEART RATE: 64 BPM | HEIGHT: 62 IN

## 2023-05-10 DIAGNOSIS — Z12.11 SCREENING FOR COLON CANCER: ICD-10-CM

## 2023-05-10 DIAGNOSIS — F41.9 ANXIETY: ICD-10-CM

## 2023-05-10 DIAGNOSIS — Z99.2 ESRD ON DIALYSIS (HCC): ICD-10-CM

## 2023-05-10 DIAGNOSIS — Z98.890 HISTORY OF AAA (ABDOMINAL AORTIC ANEURYSM) REPAIR: ICD-10-CM

## 2023-05-10 DIAGNOSIS — Z12.31 SCREENING MAMMOGRAM FOR BREAST CANCER: ICD-10-CM

## 2023-05-10 DIAGNOSIS — M79.7 FIBROMYALGIA: ICD-10-CM

## 2023-05-10 DIAGNOSIS — N18.6 ESRD ON DIALYSIS (HCC): ICD-10-CM

## 2023-05-10 DIAGNOSIS — I10 ESSENTIAL HYPERTENSION: ICD-10-CM

## 2023-05-10 DIAGNOSIS — E78.00 PURE HYPERCHOLESTEROLEMIA: ICD-10-CM

## 2023-05-10 DIAGNOSIS — F17.218 CIGARETTE NICOTINE DEPENDENCE WITH OTHER NICOTINE-INDUCED DISORDER: ICD-10-CM

## 2023-05-10 DIAGNOSIS — Z91.81 AT HIGH RISK FOR FALLS: ICD-10-CM

## 2023-05-10 PROCEDURE — 99214 OFFICE O/P EST MOD 30 MIN: CPT | Performed by: INTERNAL MEDICINE

## 2023-05-10 PROCEDURE — G8427 DOCREV CUR MEDS BY ELIG CLIN: HCPCS | Performed by: INTERNAL MEDICINE

## 2023-05-10 PROCEDURE — G8420 CALC BMI NORM PARAMETERS: HCPCS | Performed by: INTERNAL MEDICINE

## 2023-05-10 PROCEDURE — 1090F PRES/ABSN URINE INCON ASSESS: CPT | Performed by: INTERNAL MEDICINE

## 2023-05-10 PROCEDURE — 3017F COLORECTAL CA SCREEN DOC REV: CPT | Performed by: INTERNAL MEDICINE

## 2023-05-10 PROCEDURE — 1123F ACP DISCUSS/DSCN MKR DOCD: CPT | Performed by: INTERNAL MEDICINE

## 2023-05-10 PROCEDURE — G8400 PT W/DXA NO RESULTS DOC: HCPCS | Performed by: INTERNAL MEDICINE

## 2023-05-10 PROCEDURE — 4004F PT TOBACCO SCREEN RCVD TLK: CPT | Performed by: INTERNAL MEDICINE

## 2023-05-10 PROCEDURE — 3078F DIAST BP <80 MM HG: CPT | Performed by: INTERNAL MEDICINE

## 2023-05-10 PROCEDURE — 3074F SYST BP LT 130 MM HG: CPT | Performed by: INTERNAL MEDICINE

## 2023-05-10 RX ORDER — AMITRIPTYLINE HYDROCHLORIDE 10 MG/1
10 TABLET, FILM COATED ORAL NIGHTLY
Qty: 90 TABLET | Refills: 1 | Status: SHIPPED | OUTPATIENT
Start: 2023-05-10

## 2023-05-10 RX ORDER — LIDOCAINE 50 MG/G
1 PATCH TOPICAL DAILY PRN
COMMUNITY

## 2023-05-10 RX ORDER — ACETAMINOPHEN AND CODEINE PHOSPHATE 300; 30 MG/1; MG/1
1 TABLET ORAL 2 TIMES DAILY PRN
COMMUNITY
Start: 2023-02-06

## 2023-05-10 RX ORDER — CINACALCET 30 MG/1
30 TABLET, FILM COATED ORAL
COMMUNITY
Start: 2022-07-27

## 2023-05-10 SDOH — ECONOMIC STABILITY: INCOME INSECURITY: HOW HARD IS IT FOR YOU TO PAY FOR THE VERY BASICS LIKE FOOD, HOUSING, MEDICAL CARE, AND HEATING?: NOT HARD AT ALL

## 2023-05-10 SDOH — ECONOMIC STABILITY: FOOD INSECURITY: WITHIN THE PAST 12 MONTHS, YOU WORRIED THAT YOUR FOOD WOULD RUN OUT BEFORE YOU GOT MONEY TO BUY MORE.: NEVER TRUE

## 2023-05-10 SDOH — ECONOMIC STABILITY: FOOD INSECURITY: WITHIN THE PAST 12 MONTHS, THE FOOD YOU BOUGHT JUST DIDN'T LAST AND YOU DIDN'T HAVE MONEY TO GET MORE.: NEVER TRUE

## 2023-05-10 SDOH — ECONOMIC STABILITY: HOUSING INSECURITY
IN THE LAST 12 MONTHS, WAS THERE A TIME WHEN YOU DID NOT HAVE A STEADY PLACE TO SLEEP OR SLEPT IN A SHELTER (INCLUDING NOW)?: NO

## 2023-05-10 ASSESSMENT — ANXIETY QUESTIONNAIRES
IF YOU CHECKED OFF ANY PROBLEMS ON THIS QUESTIONNAIRE, HOW DIFFICULT HAVE THESE PROBLEMS MADE IT FOR YOU TO DO YOUR WORK, TAKE CARE OF THINGS AT HOME, OR GET ALONG WITH OTHER PEOPLE: NOT DIFFICULT AT ALL
5. BEING SO RESTLESS THAT IT IS HARD TO SIT STILL: 0
1. FEELING NERVOUS, ANXIOUS, OR ON EDGE: 1
3. WORRYING TOO MUCH ABOUT DIFFERENT THINGS: 0
4. TROUBLE RELAXING: 1
7. FEELING AFRAID AS IF SOMETHING AWFUL MIGHT HAPPEN: 0
GAD7 TOTAL SCORE: 3
6. BECOMING EASILY ANNOYED OR IRRITABLE: 0
2. NOT BEING ABLE TO STOP OR CONTROL WORRYING: 1

## 2023-05-10 ASSESSMENT — PATIENT HEALTH QUESTIONNAIRE - PHQ9
SUM OF ALL RESPONSES TO PHQ9 QUESTIONS 1 & 2: 2
3. TROUBLE FALLING OR STAYING ASLEEP: 0
4. FEELING TIRED OR HAVING LITTLE ENERGY: 0
6. FEELING BAD ABOUT YOURSELF - OR THAT YOU ARE A FAILURE OR HAVE LET YOURSELF OR YOUR FAMILY DOWN: 0
SUM OF ALL RESPONSES TO PHQ QUESTIONS 1-9: 2
SUM OF ALL RESPONSES TO PHQ QUESTIONS 1-9: 1
1. LITTLE INTEREST OR PLEASURE IN DOING THINGS: 1
SUM OF ALL RESPONSES TO PHQ QUESTIONS 1-9: 1
SUM OF ALL RESPONSES TO PHQ QUESTIONS 1-9: 2
SUM OF ALL RESPONSES TO PHQ QUESTIONS 1-9: 1
2. FEELING DOWN, DEPRESSED OR HOPELESS: 1
10. IF YOU CHECKED OFF ANY PROBLEMS, HOW DIFFICULT HAVE THESE PROBLEMS MADE IT FOR YOU TO DO YOUR WORK, TAKE CARE OF THINGS AT HOME, OR GET ALONG WITH OTHER PEOPLE: 0
5. POOR APPETITE OR OVEREATING: 0
8. MOVING OR SPEAKING SO SLOWLY THAT OTHER PEOPLE COULD HAVE NOTICED. OR THE OPPOSITE, BEING SO FIGETY OR RESTLESS THAT YOU HAVE BEEN MOVING AROUND A LOT MORE THAN USUAL: 0
2. FEELING DOWN, DEPRESSED OR HOPELESS: 0
1. LITTLE INTEREST OR PLEASURE IN DOING THINGS: 1
SUM OF ALL RESPONSES TO PHQ QUESTIONS 1-9: 2
SUM OF ALL RESPONSES TO PHQ QUESTIONS 1-9: 1
SUM OF ALL RESPONSES TO PHQ9 QUESTIONS 1 & 2: 1
7. TROUBLE CONCENTRATING ON THINGS, SUCH AS READING THE NEWSPAPER OR WATCHING TELEVISION: 0
9. THOUGHTS THAT YOU WOULD BE BETTER OFF DEAD, OR OF HURTING YOURSELF: 0
SUM OF ALL RESPONSES TO PHQ QUESTIONS 1-9: 2

## 2023-05-10 ASSESSMENT — LIFESTYLE VARIABLES
HOW MANY STANDARD DRINKS CONTAINING ALCOHOL DO YOU HAVE ON A TYPICAL DAY: PATIENT DOES NOT DRINK
HOW OFTEN DO YOU HAVE A DRINK CONTAINING ALCOHOL: NEVER

## 2023-05-10 ASSESSMENT — ENCOUNTER SYMPTOMS
ALLERGIC/IMMUNOLOGIC NEGATIVE: 1
EYES NEGATIVE: 1

## 2023-05-10 NOTE — PROGRESS NOTES
1. \"Have you been to the ER, urgent care clinic since your last visit? Hospitalized since your last visit? \" Yes When: 1 month ago Where: [facility] Reason for visit: bleeding     2. \"Have you seen or consulted any other health care providers outside of the [address] [place] [address] since your last visit? \" No     3. For patients aged 39-70: Has the patient had a colonoscopy / FIT/ Cologuard? No      If the patient is female:    4. For patients aged 41-77: Has the patient had a mammogram within the past 2 years? No      5. For patients aged 21-65: Has the patient had a pap smear?  NA - based on age or sex    Chief Complaint   Patient presents with    Follow-up Chronic Condition     /67 (Site: Right Upper Arm, Position: Sitting, Cuff Size: Small Adult)   Pulse 61   Temp 98.1 °F (36.7 °C) (Oral)   Resp 18   Ht 5' 2\" (1.575 m)   Wt 106 lb 6.4 oz (48.3 kg)   SpO2 100%   BMI 19.46 kg/m²

## 2023-05-10 NOTE — PROGRESS NOTES
Gilberto Allen (: 1950) is a 71 y.o. female, Established patient patient, here for evaluation of the following chief complaint(s):  Follow-up Chronic Condition         ASSESSMENT/PLAN:  Below is the assessment and plan developed based on review of pertinent history, physical exam, labs, studies, and medications. 1. Essential hypertension  2. Pure hypercholesterolemia  3. ESRD on dialysis ([address]  4. Fibromyalgia  5. History of AAA (abdominal aortic aneurysm) repair  6. Cigarette nicotine dependence with other nicotine-induced disorder  7. Screening mammogram for breast cancer  8. Screening for colon cancer  -     Cologuard (Fecal DNA Colorectal Cancer Screening)  9. Anxiety  10. At high risk for falls      Hypertension, well controlled. She is getting ESRD now through Port-A-Cath and her left AV fistula is not working blood pressure in right arm is well controlled. Continue carvedilol 12.5 mg twice a day. Clonidine 0.2 mg patch once a week. Hydralazine 25 mg in a.m. and nifedipine ER 60 mg extended release a day    Renal diet. Hypercholesteremia taking pravastatin 40 mg at bedtime. She said she had very bad fibromyalgia and PTSD and she does not take Zoloft at all for her anxiety before going for dialysis and she wanted something for her fibromyalgia discussed in length started on amitriptyline 10 mg at bedtime. Sometimes she feels wobbly and she is using walker and rollator and cane she is given Benadryl 25 mg before dialysis and that makes her clumsy and sleepy and yesterday she was given 12.5 mg Benadryl before having dialysis and she says that on the day of dialysis he gets very nervous but afterwards she does everything by herself dancing cooking cleaning    She gets dialysis on  and Saturday. Discussed about medication adjustment of blood pressure before dialysis so that she does not get hypotension.     She says she POF but does not smoke that

## 2023-05-24 ENCOUNTER — TELEPHONE (OUTPATIENT)
Facility: CLINIC | Age: 73
End: 2023-05-24

## 2023-06-08 PROBLEM — Z12.11 SCREENING FOR COLON CANCER: Status: RESOLVED | Noted: 2022-08-30 | Resolved: 2023-06-08

## 2023-08-28 ENCOUNTER — TELEPHONE (OUTPATIENT)
Facility: CLINIC | Age: 73
End: 2023-08-28

## 2023-08-28 DIAGNOSIS — N18.6 ESRD ON DIALYSIS (HCC): ICD-10-CM

## 2023-08-28 DIAGNOSIS — I10 ESSENTIAL HYPERTENSION: Primary | ICD-10-CM

## 2023-08-28 DIAGNOSIS — R07.9 CHEST PAIN OF UNCERTAIN ETIOLOGY: ICD-10-CM

## 2023-08-28 DIAGNOSIS — Z99.2 ESRD ON DIALYSIS (HCC): ICD-10-CM

## 2023-08-28 RX ORDER — SERTRALINE HYDROCHLORIDE 25 MG/1
25 TABLET, FILM COATED ORAL DAILY
Qty: 90 TABLET | Refills: 1 | Status: SHIPPED | OUTPATIENT
Start: 2023-08-28

## 2023-08-28 NOTE — TELEPHONE ENCOUNTER
Took call from patient, has been having chest pains off and on lasting about 8-10 minutes. Saw the cardiologist on the 25th. Doesn't happen everyday. Per patient is fine right now. Is requesting an xray. Please advise.

## 2023-08-31 ENCOUNTER — NURSE ONLY (OUTPATIENT)
Facility: CLINIC | Age: 73
End: 2023-08-31

## 2023-08-31 VITALS — SYSTOLIC BLOOD PRESSURE: 160 MMHG | HEART RATE: 64 BPM | DIASTOLIC BLOOD PRESSURE: 80 MMHG

## 2023-08-31 DIAGNOSIS — I10 ESSENTIAL HYPERTENSION: Primary | ICD-10-CM

## 2023-09-01 NOTE — PROGRESS NOTES
She is here for the nurse visit. She came from dialysis. She has ESRD on hemodialysis and history of hypertension she follows nephrologist Dr. Vivi Weiner taking carvedilol 12.5 mg twice a day and clonidine 0.2 mg patch every 1 week and hydralazine 25 mg every day in the morning and nifedipine ER 60 mg twice a day. Today her blood pressure is elevated. She said Dr. 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 She will bring medicine in next visit.

## 2023-09-20 RX ORDER — SENNOSIDES 8.6 MG
650 CAPSULE ORAL 2 TIMES DAILY
Qty: 90 TABLET | Refills: 2 | Status: SHIPPED | OUTPATIENT
Start: 2023-09-20

## 2023-11-10 ENCOUNTER — TELEPHONE (OUTPATIENT)
Facility: CLINIC | Age: 73
End: 2023-11-10

## 2023-11-10 NOTE — TELEPHONE ENCOUNTER
Patient had an appointment with Dr. Lavonne More on November 24th that had to be reschedule due to provider. She is having a couple of issues and she would like to be seen before January. She has Dialysis on Tuesday and Thursday.

## 2023-11-13 ENCOUNTER — TELEPHONE (OUTPATIENT)
Facility: CLINIC | Age: 73
End: 2023-11-13

## 2023-11-19 PROBLEM — M79.7 FIBROMYOSITIS: Status: RESOLVED | Noted: 2020-09-23 | Resolved: 2023-11-19

## 2023-11-19 PROBLEM — D69.6 THROMBOCYTOPENIA, UNSPECIFIED (HCC): Status: ACTIVE | Noted: 2023-11-19

## 2023-11-21 ENCOUNTER — OFFICE VISIT (OUTPATIENT)
Facility: CLINIC | Age: 73
End: 2023-11-21
Payer: MEDICARE

## 2023-11-21 VITALS
OXYGEN SATURATION: 100 % | DIASTOLIC BLOOD PRESSURE: 80 MMHG | BODY MASS INDEX: 20.02 KG/M2 | TEMPERATURE: 98.1 F | HEART RATE: 60 BPM | WEIGHT: 108.8 LBS | HEIGHT: 62 IN | SYSTOLIC BLOOD PRESSURE: 148 MMHG | RESPIRATION RATE: 18 BRPM

## 2023-11-21 DIAGNOSIS — E78.00 PURE HYPERCHOLESTEROLEMIA: ICD-10-CM

## 2023-11-21 DIAGNOSIS — N18.6 ESRD ON DIALYSIS (HCC): ICD-10-CM

## 2023-11-21 DIAGNOSIS — Z99.2 ESRD ON DIALYSIS (HCC): ICD-10-CM

## 2023-11-21 DIAGNOSIS — G60.9 IDIOPATHIC PERIPHERAL NEUROPATHY: ICD-10-CM

## 2023-11-21 DIAGNOSIS — D69.6 THROMBOCYTOPENIA, UNSPECIFIED (HCC): ICD-10-CM

## 2023-11-21 DIAGNOSIS — Z79.899 POLYPHARMACY: ICD-10-CM

## 2023-11-21 DIAGNOSIS — G47.00 INSOMNIA, UNSPECIFIED TYPE: ICD-10-CM

## 2023-11-21 DIAGNOSIS — Z91.81 AT HIGH RISK FOR INJURY RELATED TO FALL: ICD-10-CM

## 2023-11-21 DIAGNOSIS — Z98.890 HISTORY OF AAA (ABDOMINAL AORTIC ANEURYSM) REPAIR: ICD-10-CM

## 2023-11-21 DIAGNOSIS — I10 ESSENTIAL HYPERTENSION: ICD-10-CM

## 2023-11-21 DIAGNOSIS — Z12.31 ENCOUNTER FOR SCREENING MAMMOGRAM FOR MALIGNANT NEOPLASM OF BREAST: ICD-10-CM

## 2023-11-21 DIAGNOSIS — R53.1 WEAKNESS GENERALIZED: ICD-10-CM

## 2023-11-21 DIAGNOSIS — F41.9 ANXIETY: ICD-10-CM

## 2023-11-21 DIAGNOSIS — F32.9 REACTIVE DEPRESSION: ICD-10-CM

## 2023-11-21 DIAGNOSIS — F17.218 CIGARETTE NICOTINE DEPENDENCE WITH OTHER NICOTINE-INDUCED DISORDER: ICD-10-CM

## 2023-11-21 DIAGNOSIS — M79.7 FIBROMYALGIA: ICD-10-CM

## 2023-11-21 DIAGNOSIS — N25.81 HYPERPARATHYROIDISM, SECONDARY RENAL (HCC): ICD-10-CM

## 2023-11-21 PROBLEM — Z87.891 QUIT SMOKING: Status: RESOLVED | Noted: 2022-08-30 | Resolved: 2023-11-21

## 2023-11-21 PROBLEM — G89.29 CHRONIC NONINTRACTABLE HEADACHE, UNSPECIFIED HEADACHE TYPE: Status: RESOLVED | Noted: 2023-01-29 | Resolved: 2023-11-21

## 2023-11-21 PROBLEM — R51.9 CHRONIC NONINTRACTABLE HEADACHE, UNSPECIFIED HEADACHE TYPE: Status: RESOLVED | Noted: 2023-01-29 | Resolved: 2023-11-21

## 2023-11-21 PROCEDURE — 3017F COLORECTAL CA SCREEN DOC REV: CPT | Performed by: INTERNAL MEDICINE

## 2023-11-21 PROCEDURE — 3078F DIAST BP <80 MM HG: CPT | Performed by: INTERNAL MEDICINE

## 2023-11-21 PROCEDURE — G8420 CALC BMI NORM PARAMETERS: HCPCS | Performed by: INTERNAL MEDICINE

## 2023-11-21 PROCEDURE — 99214 OFFICE O/P EST MOD 30 MIN: CPT | Performed by: INTERNAL MEDICINE

## 2023-11-21 PROCEDURE — 4004F PT TOBACCO SCREEN RCVD TLK: CPT | Performed by: INTERNAL MEDICINE

## 2023-11-21 PROCEDURE — 1090F PRES/ABSN URINE INCON ASSESS: CPT | Performed by: INTERNAL MEDICINE

## 2023-11-21 PROCEDURE — 1123F ACP DISCUSS/DSCN MKR DOCD: CPT | Performed by: INTERNAL MEDICINE

## 2023-11-21 PROCEDURE — 3077F SYST BP >= 140 MM HG: CPT | Performed by: INTERNAL MEDICINE

## 2023-11-21 PROCEDURE — G8400 PT W/DXA NO RESULTS DOC: HCPCS | Performed by: INTERNAL MEDICINE

## 2023-11-21 PROCEDURE — G8484 FLU IMMUNIZE NO ADMIN: HCPCS | Performed by: INTERNAL MEDICINE

## 2023-11-21 PROCEDURE — G8427 DOCREV CUR MEDS BY ELIG CLIN: HCPCS | Performed by: INTERNAL MEDICINE

## 2023-11-21 RX ORDER — ONDANSETRON 4 MG/1
4 TABLET, ORALLY DISINTEGRATING ORAL 3 TIMES DAILY PRN
Qty: 30 TABLET | Refills: 3 | Status: SHIPPED | OUTPATIENT
Start: 2023-11-21

## 2023-11-21 ASSESSMENT — ENCOUNTER SYMPTOMS
ANAL BLEEDING: 0
CONSTIPATION: 0
DIARRHEA: 0
ABDOMINAL DISTENTION: 0
ALLERGIC/IMMUNOLOGIC NEGATIVE: 1
NAUSEA: 1
VOMITING: 0
EYES NEGATIVE: 1
BLOOD IN STOOL: 0
RESPIRATORY NEGATIVE: 1
RECTAL PAIN: 0
ABDOMINAL PAIN: 0

## 2023-11-21 NOTE — PROGRESS NOTES
Ronn Sr (: 1950) is a 71 y.o. female, Established patient patient, here for evaluation of the following chief complaint(s):  Follow-up Chronic Condition         ASSESSMENT/PLAN:  Below is the assessment and plan developed based on review of pertinent history, physical exam, labs, studies, and medications. 1. Essential hypertension  -     TSH with Reflex to FT4; Future  2. ESRD on dialysis ([address])  -     CBC with Auto Differential; Future  -     Comprehensive Metabolic Panel; Future  -     Vitamin D 25 Hydroxy; Future  -     [facility]  3. Idiopathic peripheral neuropathy  -     [facility]  4. Insomnia, unspecified type  5. Weakness generalized  6. Pure hypercholesterolemia  -     Lipid Panel; Future  7. Reactive depression  8. Polypharmacy  9. Cigarette nicotine dependence with other nicotine-induced disorder  10. Fibromyalgia  11. Anxiety  12. At high risk for injury related to fall  [facility]  13. Hyperparathyroidism, secondary renal ([address])  14. History of AAA (abdominal aortic aneurysm) repair  15. Thrombocytopenia, unspecified ([address])  -     Lipid Panel; Future  16.  Encounter for screening mammogram for malignant neoplasm of breast    Hypertension blood pressure is slightly running on upper side but she takes dialysis on  and Saturday and I will not make changes in the medicines she already takes current medicines which I will continue the same dose having ESRD on hemodialysis including nifedipine ER 60 mg twice a day,Hydralazine 25 mg in the morning she does not take twice a day, carvedilol 12.5 mg twice a day and clonidine 0.2 mg patch every 1 week and she is monitored by nephrologist at dialysis center she says she is monitored by 3 nephrologist.    Nausea most likely due to being on polypharmacy taking methocarbamol for

## 2023-11-22 LAB
25(OH)D3+25(OH)D2 SERPL-MCNC: 40.2 NG/ML (ref 30–100)
ALBUMIN SERPL-MCNC: 4.5 G/DL (ref 3.8–4.8)
ALBUMIN/GLOB SERPL: 1.9 {RATIO} (ref 1.2–2.2)
ALP SERPL-CCNC: 165 IU/L (ref 44–121)
ALT SERPL-CCNC: 17 IU/L (ref 0–32)
AST SERPL-CCNC: 22 IU/L (ref 0–40)
BASOPHILS # BLD AUTO: 0 X10E3/UL (ref 0–0.2)
BASOPHILS NFR BLD AUTO: 1 %
BILIRUB SERPL-MCNC: 0.2 MG/DL (ref 0–1.2)
BUN SERPL-MCNC: 14 MG/DL (ref 8–27)
BUN/CREAT SERPL: 7 (ref 12–28)
CALCIUM SERPL-MCNC: 8.9 MG/DL (ref 8.7–10.3)
CHLORIDE SERPL-SCNC: 99 MMOL/L (ref 96–106)
CHOLEST SERPL-MCNC: 205 MG/DL (ref 100–199)
CO2 SERPL-SCNC: 21 MMOL/L (ref 20–29)
CREAT SERPL-MCNC: 2 MG/DL (ref 0.57–1)
EGFRCR SERPLBLD CKD-EPI 2021: 26 ML/MIN/1.73
EOSINOPHIL # BLD AUTO: 0.1 X10E3/UL (ref 0–0.4)
EOSINOPHIL NFR BLD AUTO: 2 %
ERYTHROCYTE [DISTWIDTH] IN BLOOD BY AUTOMATED COUNT: 16.8 % (ref 11.7–15.4)
GLOBULIN SER CALC-MCNC: 2.4 G/DL (ref 1.5–4.5)
GLUCOSE SERPL-MCNC: 75 MG/DL (ref 70–99)
HCT VFR BLD AUTO: 34.5 % (ref 34–46.6)
HDLC SERPL-MCNC: 92 MG/DL
HGB BLD-MCNC: 10.9 G/DL (ref 11.1–15.9)
IMM GRANULOCYTES # BLD AUTO: 0 X10E3/UL (ref 0–0.1)
IMM GRANULOCYTES NFR BLD AUTO: 0 %
LDLC SERPL CALC-MCNC: 98 MG/DL (ref 0–99)
LYMPHOCYTES # BLD AUTO: 1.3 X10E3/UL (ref 0.7–3.1)
LYMPHOCYTES NFR BLD AUTO: 17 %
MCH RBC QN AUTO: 26.6 PG (ref 26.6–33)
MCHC RBC AUTO-ENTMCNC: 31.6 G/DL (ref 31.5–35.7)
MCV RBC AUTO: 84 FL (ref 79–97)
MONOCYTES # BLD AUTO: 0.5 X10E3/UL (ref 0.1–0.9)
MONOCYTES NFR BLD AUTO: 7 %
MORPHOLOGY BLD-IMP: ABNORMAL
NEUTROPHILS # BLD AUTO: 5.7 X10E3/UL (ref 1.4–7)
NEUTROPHILS NFR BLD AUTO: 73 %
PLATELET # BLD AUTO: 66 X10E3/UL (ref 150–450)
POTASSIUM SERPL-SCNC: 4.2 MMOL/L (ref 3.5–5.2)
PROT SERPL-MCNC: 6.9 G/DL (ref 6–8.5)
RBC # BLD AUTO: 4.1 X10E6/UL (ref 3.77–5.28)
SODIUM SERPL-SCNC: 140 MMOL/L (ref 134–144)
TRIGL SERPL-MCNC: 84 MG/DL (ref 0–149)
TSH SERPL DL<=0.005 MIU/L-ACNC: 1.28 UIU/ML (ref 0.45–4.5)
VLDLC SERPL CALC-MCNC: 15 MG/DL (ref 5–40)
WBC # BLD AUTO: 7.7 X10E3/UL (ref 3.4–10.8)

## 2023-12-21 PROBLEM — Z12.31 ENCOUNTER FOR SCREENING MAMMOGRAM FOR MALIGNANT NEOPLASM OF BREAST: Status: RESOLVED | Noted: 2022-08-30 | Resolved: 2023-12-21

## 2024-01-05 RX ORDER — POLYETHYLENE GLYCOL 3350 17 G/17G
17 POWDER, FOR SOLUTION ORAL DAILY
COMMUNITY

## 2024-01-05 RX ORDER — POLYETHYLENE GLYCOL 3350 17 G/17G
17 POWDER, FOR SOLUTION ORAL DAILY
Qty: 850 G | Refills: 1 | Status: SHIPPED | OUTPATIENT
Start: 2024-01-05

## 2024-01-18 RX ORDER — CLONIDINE 0.2 MG/24H
PATCH, EXTENDED RELEASE TRANSDERMAL
Qty: 12 PATCH | Refills: 0 | Status: SHIPPED | OUTPATIENT
Start: 2024-01-18

## 2024-01-22 RX ORDER — NIFEDIPINE 60 MG/1
60 TABLET, FILM COATED, EXTENDED RELEASE ORAL EVERY 12 HOURS
Qty: 60 TABLET | Refills: 0 | Status: SHIPPED | OUTPATIENT
Start: 2024-01-22

## 2024-02-05 RX ORDER — CARVEDILOL 12.5 MG/1
12.5 TABLET ORAL 2 TIMES DAILY
Qty: 180 TABLET | Refills: 1 | Status: SHIPPED | OUTPATIENT
Start: 2024-02-05

## 2024-02-14 ENCOUNTER — TELEPHONE (OUTPATIENT)
Facility: CLINIC | Age: 74
End: 2024-02-14

## 2024-02-14 RX ORDER — HYDRALAZINE HYDROCHLORIDE 25 MG/1
25 TABLET, FILM COATED ORAL EVERY MORNING
Qty: 30 TABLET | Refills: 0 | Status: SHIPPED | OUTPATIENT
Start: 2024-02-14

## 2024-02-14 NOTE — TELEPHONE ENCOUNTER
Patient called requesting a refill for the following medication:      hydrALAZINE (APRESOLINE) 25 MG tablet   30 day supply  Take 1 tablet by mouth every morning        Please send to [facility] on the [address] in [place].

## 2024-02-16 RX ORDER — NIFEDIPINE 60 MG/1
60 TABLET, FILM COATED, EXTENDED RELEASE ORAL EVERY 12 HOURS
Qty: 60 TABLET | Refills: 0 | Status: SHIPPED | OUTPATIENT
Start: 2024-02-16

## 2024-03-13 RX ORDER — HYDRALAZINE HYDROCHLORIDE 25 MG/1
25 TABLET, FILM COATED ORAL EVERY MORNING
Qty: 30 TABLET | Refills: 5 | Status: SHIPPED | OUTPATIENT
Start: 2024-03-13

## 2024-03-19 RX ORDER — HYDRALAZINE HYDROCHLORIDE 25 MG/1
25 TABLET, FILM COATED ORAL EVERY MORNING
Qty: 30 TABLET | Refills: 5 | OUTPATIENT
Start: 2024-03-19

## 2024-03-21 ENCOUNTER — OFFICE VISIT (OUTPATIENT)
Facility: CLINIC | Age: 74
End: 2024-03-21

## 2024-03-21 VITALS
SYSTOLIC BLOOD PRESSURE: 170 MMHG | BODY MASS INDEX: 19.51 KG/M2 | HEIGHT: 62 IN | DIASTOLIC BLOOD PRESSURE: 90 MMHG | HEART RATE: 60 BPM | WEIGHT: 106 LBS | TEMPERATURE: 97.7 F

## 2024-03-21 DIAGNOSIS — E78.00 PURE HYPERCHOLESTEROLEMIA: ICD-10-CM

## 2024-03-21 DIAGNOSIS — I10 ESSENTIAL HYPERTENSION: Primary | ICD-10-CM

## 2024-03-21 DIAGNOSIS — M15.9 PRIMARY OSTEOARTHRITIS INVOLVING MULTIPLE JOINTS: ICD-10-CM

## 2024-03-21 DIAGNOSIS — G47.00 INSOMNIA, UNSPECIFIED TYPE: ICD-10-CM

## 2024-03-21 DIAGNOSIS — N25.81 HYPERPARATHYROIDISM, SECONDARY RENAL (HCC): ICD-10-CM

## 2024-03-21 DIAGNOSIS — G60.9 IDIOPATHIC PERIPHERAL NEUROPATHY: ICD-10-CM

## 2024-03-21 DIAGNOSIS — K59.09 CHRONIC CONSTIPATION: ICD-10-CM

## 2024-03-21 DIAGNOSIS — N18.6 ESRD ON DIALYSIS (HCC): ICD-10-CM

## 2024-03-21 DIAGNOSIS — D69.6 THROMBOCYTOPENIA, UNSPECIFIED (HCC): ICD-10-CM

## 2024-03-21 DIAGNOSIS — F17.218 CIGARETTE NICOTINE DEPENDENCE WITH OTHER NICOTINE-INDUCED DISORDER: ICD-10-CM

## 2024-03-21 DIAGNOSIS — Z79.899 POLYPHARMACY: ICD-10-CM

## 2024-03-21 DIAGNOSIS — F32.9 REACTIVE DEPRESSION: ICD-10-CM

## 2024-03-21 DIAGNOSIS — F41.9 ANXIETY: ICD-10-CM

## 2024-03-21 DIAGNOSIS — M79.7 FIBROMYALGIA: ICD-10-CM

## 2024-03-21 DIAGNOSIS — Z99.2 ESRD ON DIALYSIS (HCC): ICD-10-CM

## 2024-03-21 DIAGNOSIS — Z98.890 HISTORY OF AAA (ABDOMINAL AORTIC ANEURYSM) REPAIR: ICD-10-CM

## 2024-03-21 RX ORDER — ALPRAZOLAM 0.25 MG/1
0.25 TABLET ORAL NIGHTLY PRN
Qty: 30 TABLET | Refills: 2 | Status: SHIPPED | OUTPATIENT
Start: 2024-03-21 | End: 2024-06-19

## 2024-03-21 RX ORDER — AMLODIPINE BESYLATE 10 MG/1
10 TABLET ORAL DAILY
Qty: 30 TABLET | Refills: 5 | Status: SHIPPED | OUTPATIENT
Start: 2024-03-21

## 2024-03-21 RX ORDER — POLYETHYLENE GLYCOL 3350 17 G/17G
17 POWDER, FOR SOLUTION ORAL DAILY
Status: CANCELLED | OUTPATIENT
Start: 2024-03-21

## 2024-03-21 RX ORDER — POLYETHYLENE GLYCOL 3350 17 G/17G
17 POWDER, FOR SOLUTION ORAL DAILY
Qty: 850 G | Refills: 4 | Status: SHIPPED | OUTPATIENT
Start: 2024-03-21

## 2024-03-21 RX ORDER — PRAVASTATIN SODIUM 40 MG
40 TABLET ORAL
Qty: 30 TABLET | Status: CANCELLED | OUTPATIENT
Start: 2024-03-21

## 2024-03-21 RX ORDER — PRAVASTATIN SODIUM 40 MG
40 TABLET ORAL NIGHTLY
Qty: 30 TABLET | Refills: 5 | Status: SHIPPED | OUTPATIENT
Start: 2024-03-21

## 2024-03-21 RX ORDER — HYDRALAZINE HYDROCHLORIDE 50 MG/1
50 TABLET, FILM COATED ORAL 3 TIMES DAILY
Qty: 90 TABLET | Refills: 5 | Status: SHIPPED | OUTPATIENT
Start: 2024-03-21

## 2024-03-21 RX ORDER — NIFEDIPINE 90 MG/1
90 TABLET, FILM COATED, EXTENDED RELEASE ORAL DAILY
Qty: 30 TABLET | Refills: 3 | Status: SHIPPED | OUTPATIENT
Start: 2024-03-21 | End: 2024-03-21 | Stop reason: CLARIF

## 2024-03-21 ASSESSMENT — ENCOUNTER SYMPTOMS
GASTROINTESTINAL NEGATIVE: 1
RESPIRATORY NEGATIVE: 1
ALLERGIC/IMMUNOLOGIC NEGATIVE: 1
EYES NEGATIVE: 1

## 2024-03-21 ASSESSMENT — PATIENT HEALTH QUESTIONNAIRE - PHQ9
10. IF YOU CHECKED OFF ANY PROBLEMS, HOW DIFFICULT HAVE THESE PROBLEMS MADE IT FOR YOU TO DO YOUR WORK, TAKE CARE OF THINGS AT HOME, OR GET ALONG WITH OTHER PEOPLE: NOT DIFFICULT AT ALL
1. LITTLE INTEREST OR PLEASURE IN DOING THINGS: NOT AT ALL
SUM OF ALL RESPONSES TO PHQ QUESTIONS 1-9: 0
SUM OF ALL RESPONSES TO PHQ QUESTIONS 1-9: 0
9. THOUGHTS THAT YOU WOULD BE BETTER OFF DEAD, OR OF HURTING YOURSELF: NOT AT ALL
SUM OF ALL RESPONSES TO PHQ QUESTIONS 1-9: 0
8. MOVING OR SPEAKING SO SLOWLY THAT OTHER PEOPLE COULD HAVE NOTICED. OR THE OPPOSITE, BEING SO FIGETY OR RESTLESS THAT YOU HAVE BEEN MOVING AROUND A LOT MORE THAN USUAL: NOT AT ALL
6. FEELING BAD ABOUT YOURSELF - OR THAT YOU ARE A FAILURE OR HAVE LET YOURSELF OR YOUR FAMILY DOWN: NOT AT ALL
SUM OF ALL RESPONSES TO PHQ QUESTIONS 1-9: 0
3. TROUBLE FALLING OR STAYING ASLEEP: NOT AT ALL
2. FEELING DOWN, DEPRESSED OR HOPELESS: NOT AT ALL
7. TROUBLE CONCENTRATING ON THINGS, SUCH AS READING THE NEWSPAPER OR WATCHING TELEVISION: NOT AT ALL
5. POOR APPETITE OR OVEREATING: NOT AT ALL
4. FEELING TIRED OR HAVING LITTLE ENERGY: NOT AT ALL
SUM OF ALL RESPONSES TO PHQ9 QUESTIONS 1 & 2: 0

## 2024-03-21 NOTE — PROGRESS NOTES
.  Chief Complaint   Patient presents with    Follow-up Chronic Condition    Hypertension       .  \"Have you been to the ER, urgent care clinic since your last visit?  Hospitalized since your last visit?\"    NO    “Have you seen or consulted any other health care providers outside of [facility] since your last visit?”    YES - When: approximately 1 months ago.  Where and Why: Dr. Arrington for follow up.              Click Here for Release of Records Request       .BP (!) 184/100 (Site: Right Upper Arm, Position: Sitting, Cuff Size: Medium Adult)   Pulse 68   Temp 97.7 °F (36.5 °C) (Oral)   Ht 1.575 m (5' 2\")   Wt 48.1 kg (106 lb)   BMI 19.39 kg/m²           Click Here for Release of Records Request   
tablet Take 1 tablet by mouth nightly as needed for Anxiety for up to 90 days. Max Daily Amount: 0.25 mg 30 tablet 2    carvedilol (COREG) 12.5 MG tablet take 1 tablet by mouth twice a day 180 tablet 1    cloNIDine (CATAPRES) 0.2 MG/24HR PTWK APPLY 1 PATCH TO SKIN EVERY 7 DAYS 12 patch 0    polyethylene glycol (GLYCOLAX) 17 GM/SCOOP powder Take 17 g by mouth daily      acetaminophen (TYLENOL) 650 MG extended release tablet TAKE 1 TABLET BY MOUTH TWICE A DAY AS NEEDED FOR PAIN 90 tablet 2    acetaminophen-codeine (TYLENOL #3) 300-30 MG per tablet Take 1 tablet by mouth 2 times daily as needed.      lidocaine (LIDODERM) 5 % Place 1 patch onto the skin daily as needed      amitriptyline (ELAVIL) 10 MG tablet Take 1 tablet by mouth nightly 90 tablet 1    albuterol sulfate HFA (PROVENTIL;VENTOLIN;PROAIR) 108 (90 Base) MCG/ACT inhaler Inhale 1 puff into the lungs every 6 hours as needed      clobetasol (TEMOVATE) 0.05 % ointment Apply topically 2 times daily      gabapentin (NEURONTIN) 100 MG capsule Take 1 capsule by mouth at bedtime.      methocarbamol (ROBAXIN) 750 MG tablet Take 2 tablets by mouth as needed      polyethylene glycol (MIRALAX) 17 g PACK packet Take 17 g by mouth daily (Patient not taking: Reported on 3/21/2024)      ondansetron (ZOFRAN-ODT) 4 MG disintegrating tablet Take 1 tablet by mouth 3 times daily as needed for Nausea or Vomiting (Patient not taking: Reported on 3/21/2024) 30 tablet 3    cinacalcet (SENSIPAR) 30 MG tablet Take 1 tablet by mouth twice a week (Patient not taking: Reported on 3/21/2024)       No current facility-administered medications for this visit.      Past Medical History:   Diagnosis Date    Abdominal aortic aneurysm (AAA) (HCC) 9/23/2020    Asthmatic bronchitis 9/23/2020    Azotemia 9/23/2020    Benign paroxysmal positional vertigo 9/23/2020    Contact dermatitis 9/23/2020    Degenerative joint disease involving multiple joints 9/23/2020    Edema 9/23/2020    Essential

## 2024-03-27 ENCOUNTER — TELEPHONE (OUTPATIENT)
Facility: CLINIC | Age: 74
End: 2024-03-27

## 2024-03-27 NOTE — TELEPHONE ENCOUNTER
----- Message from Leslie Stewart sent at 3/18/2024 11:14 AM EDT -----  Subject: Appointment Request    Reason for Call: Established Patient Appointment needed: Routine Existing   Condition Follow Up    QUESTIONS    Reason for appointment request? Other - [facility] Can not book     Additional Information for Provider? Pt needs to rescheduled will not   allow me, no answer at office please call. She will be at dialysis   ---------------------------------------------------------------------------  --------------  CALL BACK INFO  [phone]; OK to leave message on voicemail  ---------------------------------------------------------------------------  --------------  SCRIPT ANSWERS

## 2024-04-01 NOTE — TELEPHONE ENCOUNTER
Called patient to reschedule appointment.  She stated that she would be able to keep the appointment she has scheduled in July.  She said she gets out of dialysis around 9:30 am or 9:45 am so she will be able to make the appointment.

## 2024-04-10 ENCOUNTER — TELEPHONE (OUTPATIENT)
Facility: CLINIC | Age: 74
End: 2024-04-10

## 2024-04-10 NOTE — TELEPHONE ENCOUNTER
Patient came in today with her  and stated that she woke up yesterday with her face was extremely swollen and that the nurse estrada 3.2 ml of fluid off of her at dialysis. She believes it is from hydralazine. Please advise.

## 2024-04-11 RX ORDER — CLONIDINE 0.2 MG/24H
PATCH, EXTENDED RELEASE TRANSDERMAL
Qty: 12 PATCH | Refills: 2 | Status: SHIPPED | OUTPATIENT
Start: 2024-04-11

## 2024-04-29 ENCOUNTER — TELEPHONE (OUTPATIENT)
Facility: CLINIC | Age: 74
End: 2024-04-29

## 2024-04-29 NOTE — TELEPHONE ENCOUNTER
Patient called and stated that [facility] and they diagnosed her with bronchitis. She is afraid to take the hydralazine because it is causing her to swell.

## 2024-05-10 ENCOUNTER — TELEPHONE (OUTPATIENT)
Facility: CLINIC | Age: 74
End: 2024-05-10

## 2024-05-10 NOTE — TELEPHONE ENCOUNTER
Patient called and stated that Dr. Veras lower her dose for hydralazine but she is still having swelling in her face. She wants to be prescribed something else. Her dialysis is worried about this the swelling.

## 2024-05-13 ENCOUNTER — TELEPHONE (OUTPATIENT)
Facility: CLINIC | Age: 74
End: 2024-05-13

## 2024-05-13 NOTE — TELEPHONE ENCOUNTER
Patient stated her face is still swollen and very achy and her dialysis team is concern. She wants a new diuretic to be called into [facility].

## 2024-06-14 RX ORDER — NIFEDIPINE 60 MG/1
60 TABLET, EXTENDED RELEASE ORAL DAILY
Qty: 180 TABLET | Refills: 0 | Status: SHIPPED | OUTPATIENT
Start: 2024-06-14

## 2024-07-11 ENCOUNTER — TELEPHONE (OUTPATIENT)
Facility: CLINIC | Age: 74
End: 2024-07-11

## 2024-07-11 ENCOUNTER — CLINICAL DOCUMENTATION (OUTPATIENT)
Facility: CLINIC | Age: 74
End: 2024-07-11

## 2024-07-11 NOTE — PROGRESS NOTES
Patient came in today and wanted to advise that her face continues to be swollen after her change in BP medications she would like to know if they can be adjusted and also wanted to know if we could get her in next week? I advised her that I would  send you a message to advise on this. She was also told to go to the ER and she said she has been dealing with this for 2 months and the above is what she feels needs to happen. SM LPN

## 2024-07-23 ENCOUNTER — OFFICE VISIT (OUTPATIENT)
Facility: CLINIC | Age: 74
End: 2024-07-23
Payer: MEDICARE

## 2024-07-23 VITALS
SYSTOLIC BLOOD PRESSURE: 120 MMHG | BODY MASS INDEX: 20.56 KG/M2 | HEIGHT: 62 IN | DIASTOLIC BLOOD PRESSURE: 70 MMHG | WEIGHT: 111.7 LBS | OXYGEN SATURATION: 90 % | HEART RATE: 60 BPM | TEMPERATURE: 98.4 F | RESPIRATION RATE: 18 BRPM

## 2024-07-23 DIAGNOSIS — F17.218 CIGARETTE NICOTINE DEPENDENCE WITH OTHER NICOTINE-INDUCED DISORDER: ICD-10-CM

## 2024-07-23 DIAGNOSIS — Z99.2 ESRD ON DIALYSIS (HCC): ICD-10-CM

## 2024-07-23 DIAGNOSIS — F41.9 ANXIETY: ICD-10-CM

## 2024-07-23 DIAGNOSIS — N18.6 ESRD ON DIALYSIS (HCC): ICD-10-CM

## 2024-07-23 DIAGNOSIS — R05.2 SUBACUTE COUGH: ICD-10-CM

## 2024-07-23 DIAGNOSIS — N25.81 HYPERPARATHYROIDISM, SECONDARY RENAL (HCC): ICD-10-CM

## 2024-07-23 DIAGNOSIS — F32.9 REACTIVE DEPRESSION: ICD-10-CM

## 2024-07-23 DIAGNOSIS — M15.9 PRIMARY OSTEOARTHRITIS INVOLVING MULTIPLE JOINTS: ICD-10-CM

## 2024-07-23 DIAGNOSIS — E78.00 PURE HYPERCHOLESTEROLEMIA: ICD-10-CM

## 2024-07-23 DIAGNOSIS — M79.7 FIBROMYALGIA: ICD-10-CM

## 2024-07-23 DIAGNOSIS — Z98.890 HISTORY OF AAA (ABDOMINAL AORTIC ANEURYSM) REPAIR: ICD-10-CM

## 2024-07-23 DIAGNOSIS — I10 ESSENTIAL HYPERTENSION: Primary | ICD-10-CM

## 2024-07-23 DIAGNOSIS — Z91.81 AT HIGH RISK FOR FALLS: ICD-10-CM

## 2024-07-23 DIAGNOSIS — G60.9 IDIOPATHIC PERIPHERAL NEUROPATHY: ICD-10-CM

## 2024-07-23 PROCEDURE — G8427 DOCREV CUR MEDS BY ELIG CLIN: HCPCS | Performed by: INTERNAL MEDICINE

## 2024-07-23 PROCEDURE — 1123F ACP DISCUSS/DSCN MKR DOCD: CPT | Performed by: INTERNAL MEDICINE

## 2024-07-23 PROCEDURE — 3078F DIAST BP <80 MM HG: CPT | Performed by: INTERNAL MEDICINE

## 2024-07-23 PROCEDURE — 4004F PT TOBACCO SCREEN RCVD TLK: CPT | Performed by: INTERNAL MEDICINE

## 2024-07-23 PROCEDURE — 1090F PRES/ABSN URINE INCON ASSESS: CPT | Performed by: INTERNAL MEDICINE

## 2024-07-23 PROCEDURE — G8400 PT W/DXA NO RESULTS DOC: HCPCS | Performed by: INTERNAL MEDICINE

## 2024-07-23 PROCEDURE — 99214 OFFICE O/P EST MOD 30 MIN: CPT | Performed by: INTERNAL MEDICINE

## 2024-07-23 PROCEDURE — G8420 CALC BMI NORM PARAMETERS: HCPCS | Performed by: INTERNAL MEDICINE

## 2024-07-23 PROCEDURE — 3074F SYST BP LT 130 MM HG: CPT | Performed by: INTERNAL MEDICINE

## 2024-07-23 PROCEDURE — 3017F COLORECTAL CA SCREEN DOC REV: CPT | Performed by: INTERNAL MEDICINE

## 2024-07-23 RX ORDER — PSEUDOEPHEDRINE HCL 30 MG
100 TABLET ORAL
COMMUNITY
Start: 2022-06-10

## 2024-07-23 RX ORDER — AMOXICILLIN 500 MG/1
500 CAPSULE ORAL
Qty: 7 CAPSULE | Refills: 0 | Status: SHIPPED | OUTPATIENT
Start: 2024-07-23 | End: 2024-07-30

## 2024-07-23 RX ORDER — BUPROPION HYDROCHLORIDE 150 MG/1
150 TABLET, EXTENDED RELEASE ORAL DAILY
Qty: 30 TABLET | Refills: 2 | Status: SHIPPED | OUTPATIENT
Start: 2024-07-23

## 2024-07-23 ASSESSMENT — ENCOUNTER SYMPTOMS
CHEST TIGHTNESS: 0
SHORTNESS OF BREATH: 0
GASTROINTESTINAL NEGATIVE: 1
WHEEZING: 0
CHOKING: 0
COUGH: 1
STRIDOR: 0
APNEA: 0
ALLERGIC/IMMUNOLOGIC NEGATIVE: 1
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
## 2024-08-08 RX ORDER — CARVEDILOL 12.5 MG/1
12.5 TABLET ORAL 2 TIMES DAILY
Qty: 180 TABLET | Refills: 1 | Status: SHIPPED | OUTPATIENT
Start: 2024-08-08

## 2024-10-28 RX ORDER — PRAVASTATIN SODIUM 40 MG
40 TABLET ORAL NIGHTLY
Qty: 90 TABLET | Refills: 2 | Status: SHIPPED | OUTPATIENT
Start: 2024-10-28

## 2024-10-30 ENCOUNTER — ANESTHESIA (OUTPATIENT)
Facility: HOSPITAL | Age: 74
End: 2024-10-30
Payer: MEDICARE

## 2024-10-30 ENCOUNTER — ANESTHESIA EVENT (OUTPATIENT)
Facility: HOSPITAL | Age: 74
End: 2024-10-30
Payer: MEDICARE

## 2024-10-30 ENCOUNTER — HOSPITAL ENCOUNTER (OUTPATIENT)
Facility: HOSPITAL | Age: 74
Setting detail: OUTPATIENT SURGERY
Discharge: HOME | End: 2024-10-30
Attending: SPECIALIST | Admitting: SPECIALIST
Payer: MEDICARE

## 2024-10-30 VITALS
SYSTOLIC BLOOD PRESSURE: 154 MMHG | HEART RATE: 58 BPM | RESPIRATION RATE: 21 BRPM | DIASTOLIC BLOOD PRESSURE: 74 MMHG | WEIGHT: 104.5 LBS | BODY MASS INDEX: 19.23 KG/M2 | HEIGHT: 62 IN | OXYGEN SATURATION: 97 % | TEMPERATURE: 97.9 F

## 2024-10-30 PROCEDURE — 3600007502: Performed by: SPECIALIST

## 2024-10-30 PROCEDURE — 7100000011 HC PHASE II RECOVERY - ADDTL 15 MIN: Performed by: SPECIALIST

## 2024-10-30 PROCEDURE — 6360000002 HC RX W HCPCS: Performed by: NURSE ANESTHETIST, CERTIFIED REGISTERED

## 2024-10-30 PROCEDURE — 3700000000 HC ANESTHESIA ATTENDED CARE: Performed by: SPECIALIST

## 2024-10-30 PROCEDURE — 2709999900 HC NON-CHARGEABLE SUPPLY: Performed by: SPECIALIST

## 2024-10-30 PROCEDURE — 7100000010 HC PHASE II RECOVERY - FIRST 15 MIN: Performed by: SPECIALIST

## 2024-10-30 PROCEDURE — 88305 TISSUE EXAM BY PATHOLOGIST: CPT

## 2024-10-30 RX ORDER — SODIUM CHLORIDE 0.9 % (FLUSH) 0.9 %
5-40 SYRINGE (ML) INJECTION PRN
Status: DISCONTINUED | OUTPATIENT
Start: 2024-10-30 | End: 2024-10-30 | Stop reason: HOSPADM

## 2024-10-30 RX ORDER — SODIUM CHLORIDE 9 MG/ML
INJECTION, SOLUTION INTRAVENOUS CONTINUOUS
Status: DISCONTINUED | OUTPATIENT
Start: 2024-10-30 | End: 2024-10-30 | Stop reason: HOSPADM

## 2024-10-30 RX ORDER — PROPOFOL 10 MG/ML
INJECTION, EMULSION INTRAVENOUS
Status: DISCONTINUED | OUTPATIENT
Start: 2024-10-30 | End: 2024-10-30 | Stop reason: SDUPTHER

## 2024-10-30 RX ADMIN — PROPOFOL 70 MG: 10 INJECTION, EMULSION INTRAVENOUS at 08:33

## 2024-10-30 RX ADMIN — PROPOFOL 150 MCG/KG/MIN: 10 INJECTION, EMULSION INTRAVENOUS at 08:34

## 2024-10-30 ASSESSMENT — PAIN - FUNCTIONAL ASSESSMENT
PAIN_FUNCTIONAL_ASSESSMENT: 0-10
PAIN_FUNCTIONAL_ASSESSMENT: NONE - DENIES PAIN
PAIN_FUNCTIONAL_ASSESSMENT: NONE - DENIES PAIN

## 2024-10-30 NOTE — ANESTHESIA POSTPROCEDURE EVALUATION
Department of Anesthesiology  Postprocedure Note    Patient: Crystal Adler  MRN: [id number]  YOB: 1950  Date of evaluation: 10/30/2024    Procedure Summary       Date: 10/30/24 Room / Location: [place] [address] / [facility] ENDOSCOPY    Anesthesia Start: 0830 Anesthesia Stop: 0839    Procedures:       ESOPHAGOGASTRODUODENOSCOPY (Upper GI Region)      ESOPHAGOGASTRODUODENOSCOPY BIOPSY (Upper GI Region) Diagnosis:       Weight loss      Renal failure, unspecified chronicity      (Weight loss [R63.4])      (Renal failure, unspecified chronicity [N19])    Surgeons: Jesus Akbar MD Responsible Provider: Collin Agrawal MD    Anesthesia Type: TIVA ASA Status: 3            Anesthesia Type: TIVA    Thiago Phase I: Thiago Score: 10    Thiago Phase II: Thiago Score: 10    Anesthesia Post Evaluation    Patient location during evaluation: PACU  Patient participation: complete - patient participated  Level of consciousness: awake and alert  Pain score: 0  Airway patency: patent  Nausea & Vomiting: no nausea and no vomiting  Cardiovascular status: blood pressure returned to baseline  Respiratory status: acceptable  Hydration status: euvolemic  Pain management: satisfactory to patient    No notable events documented.

## 2024-10-30 NOTE — OP NOTE
[facility]  [facility]  Jesus Akbar MD  [phone]      2024    Esophagogastroduodenoscopy (EGD) Procedure Note  Crystal Adler  : 1950  [facility] Medical Record Number: [id number]      Indications:   Weight loss.  She admits that she eats less on dialysis days than she used to.  Referring Physician:  Shannon Veras MD  Anesthesia/Sedation:  Conscious sedation/deep sedation/monitored anesthesia -- see notes.  Endoscopist:  Dr. Jesus Akbar  Complications:  None  Estimated Blood Loss:  None    Permit:  The indications, risks, benefits and alternatives were reviewed with the patient or their decision maker who was provided an opportunity to ask questions and all questions were answered.  The specific risks of esophagogastroduodenoscopy with conscious sedation were reviewed, including but not limited to anesthetic complication, bleeding, adverse drug reaction, missed lesion, infection, IV site reactions, and intestinal perforation which would lead to the need for surgical repair.  Alternatives to EGD including radiographic imaging, observation without testing, or laboratory testing were reviewed as well as the limitations of those alternatives discussed.  After considering the options and having all their questions answered, the patient or their decision maker provided both verbal and written consent to proceed.       Procedure in Detail:  After obtaining informed consent, positioning of the patient in the left lateral decubitus position, and conduction of a pre-procedure pause or \"time out\" the endoscope was introduced into the mouth and advanced to the duodenum.  A careful inspection was made, and findings or interventions are described below.    Findings:   Esophagus:normal  Stomach: Mild focal antral erythema, not likely clinically significant but sampled/biopsied with cold forceps for

## 2024-10-30 NOTE — DISCHARGE INSTRUCTIONS
[facility]  [facility]  Jesus Bianchi MD  [phone]      October 30, 2024     Crystal Adler  YOB: 1950    ENDOSCOPY DISCHARGE INSTRUCTIONS    If there is redness at IV site you should apply warm compress to area.  If redness or soreness persist contact Dr. Bianchi' or your primary care doctor.    Gaseous discomfort may develop, but walking, belching will help relieve this.  You may not operate a vehicle for 12 hours  You may not operate machinery or dangerous appliances for rest of today  You may not drink alcoholic beverages for 12 hours  Avoid making any critical decisions for 24 hours    DIET:  You may resume your normal diet, but some patients find that heavy or large meals may lead to indigestion or vomiting.  I suggest a light meal as first food intake.    MEDICATIONS:  The use of some over-the-counter pain medication may lead to bleeding after biopsies or other procedures you may have had done.  Tylenol (also called acetaminophen) is safe to take and will not lead to bleeding.  Based on the procedure you had today you may not safely take aspirin or aspirin-like products for the next ten (10) days.      ACTIVITY:  You may resume your normal household activities, but it is recommended that you spend the remainder of the day resting -  avoid any strenuous activity.    CALL DR. BIANCHI' OFFICE IF:  Increasing pain, nausea, vomiting  Abdominal distension (swelling)  Significant new or increased bleeding (oral or rectal)  Fever/Chills  Chest pain/shortness of breath                   Additional instructions:   Great news, no ulcer, cancer, or bleeding.  I did take samples/biopsies to make sure that the tissue is healthy under the microscope and will contact you with those results in 10-14 days.     It was an honor to be your doctor today.  Please let me or my office staff know if you have any feedback about today's

## 2024-10-30 NOTE — H&P
Date: 2024 2:30 PM  Patient Name: Crystal Ba  Account #: [id number]  Gender: Female   (age): 1950 (73)  Provider:    Jesus Akbar MD     Referring Physician:    Shannon Vears  [address] [place] [id number] [phone] (phone)  [phone] (fax)     Chief Complaint:    Weight loss       History of Present Illness:  73 F reports about 70 pounds weight loss since starting dialysis.  Notes that she doesn't eat as much on dialysis days because of fatigue.  This could be an explanation for her symptoms, and this would not likely require additional treatment.  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 Arrington.  She suffered a bleed from the angiography site.  She is unsure as to the level of success of vascular stent procedure but volunteered that some symptoms after eating prior to that procedure are noted to be much better since that procedure.  Nonetheless, residual or recurrent intestinal vascular disease might be an explanation for her weight loss.  This could be evaluated with visceral angiography either direct or via CT, but I have concerns over contrast nephropathy.  One might counter that she is already on dialysis what harm could come from further renal insufficiency; but she does report that she urinates a fair amount and as such I hypothesize she may still have some clearance which I'd hate to destroy with contrast load.  As a result I will defer this differential diagnostic consideration for the time being.  Of note she denies post meal pain or sitophobia.  Finally, there could be some hypothetical acid peptic disease leading to recommendation for upper GI endoscopy.  She is up to date with colon cancer screening having submitted negative cologuard just a few months ago so I have no plans for colonic endoscopy at this time.  Past Medical History  Medical  Conditions:   Arthritis  High blood pressure  Kidney disease  Surgical Procedures:   Other major surgeries:, Stents in Thigh  Aortic Aneurism -2022  Dx Studies:   Abdominal U/S  Colonoscopy  CT Scan  Other________  Medications:   albuterol sulfate 90 mcg/actuation Administer 1 puff by mouth once a day as needed  Arthritis Pain Relief (acetam) 650 mg Take 1 tablet by mouth once a day as directed  carvedilol 12.5 mg Take 1 tablet by mouth once a day as directed  clonidine 0.2 mg/24 hr Apply 1 a small amount by mouth once a day as needed  famotidine 40 mg Take 1 tablet by mouth once a day as directed  gabapentin 100 mg Take 1 capsule by mouth once a day as directed  hydralazine 50 mg Take 1 tablet by mouth three times a day  methocarbamol 750 mg Take 2 tablet by mouth once a day as directed  nifedipine 60 mg Take 1 tablet by mouth once a day as directed  ondansetron 4 mg Take 1 tablet by mouth once a day as directed  pravastatin 40 mg Take 1 tablet by mouth once a day as directed  Allergies:   Patient has no known allergies  Immunizations:   Influenza vaccination, 9/21/2023  Influenza, seasonal, injectable, 09/21/2023  Social History  Alcohol:   None  Tobacco:   Current every day smoker  Other.  Drugs:   None  Exercise:   None  Caffeine:   Weekly.  Marital Status:         Family History   No history of Colon Cancer, Colon Polyps, Esophogeal Cancer, GI Cancers, IBD (Crohn's or UC), Liver disease  Review of Systems:  Cardiovascular: Denies chest pain, irregular heart beat, palpitations, peripheral edema, syncope, Sweats.  Constitutional: Denies fatigue, fever, loss of appetite, weight gain, weight loss.  ENMT: Denies nose bleeds, sore throat, hearing loss.  Endocrine: Denies excessive thirst, heat intolerance.  Eyes: Denies loss of vision.  Gastrointestinal: Denies abdominal pain, abdominal swelling, change in bowel habits, constipation, diarrhea, Bloating/gas, heartburn, jaundice, nausea, rectal bleeding, stomach

## 2024-10-30 NOTE — ANESTHESIA PRE PROCEDURE
POC Tests: No results for input(s): \"POCGLU\", \"POCNA\", \"POCK\", \"POCCL\", \"POCBUN\", \"POCHEMO\", \"POCHCT\" in the last 72 hours.    Coags:   Lab Results   Component Value Date/Time    PROTIME 13.7 02/19/2023 02:06 PM    INR 1.1 02/19/2023 02:06 PM    APTT 32.1 02/19/2023 02:06 PM       HCG (If Applicable): No results found for: \"PREGTESTUR\", \"PREGSERUM\", \"HCG\", \"HCGQUANT\"     ABGs: No results found for: \"PHART\", \"PO2ART\", \"VLV9WET\", \"CFV1PGF\", \"BEART\", \"B1NBHDSI\"     Type & Screen (If Applicable):  No results found for: \"ABORH\", \"LABANTI\"    Drug/Infectious Status (If Applicable):  No results found for: \"HIV\", \"HEPCAB\"    COVID-19 Screening (If Applicable): No results found for: \"COVID19\"        Anesthesia Evaluation  Patient summary reviewed and Nursing notes reviewed  Airway: Mallampati: II  TM distance: >3 FB   Neck ROM: full  Mouth opening: > = 3 FB   Dental: normal exam         Pulmonary: breath sounds clear to auscultation  (+)           asthma:                            Cardiovascular:  Exercise tolerance: good (>4 METS)  (+) hypertension:, hyperlipidemia        Rhythm: regular  Rate: normal                    Neuro/Psych:   (+) psychiatric history:   (-) neuromuscular disease           GI/Hepatic/Renal:   (+) renal disease: ESRD and dialysis          Endo/Other: Negative Endo/Other ROS                    Abdominal:             Vascular:          Other Findings:       Anesthesia Plan      MAC     ASA 3       Induction: intravenous.      Anesthetic plan and risks discussed with patient.                    Collin Agrawal MD   10/30/2024

## 2024-10-30 NOTE — PERIOP NOTE
Initial RN admission and assessment performed and documented in Endoscopy navigator.     Patient evaluated by anesthesia in pre-procedure holding.     All procedural vital signs, airway assessment, and level of consciousness information monitored and recorded by anesthesia staff on the anesthesia record.     Report received from CRNA post procedure.  Patient transported to recovery area by RN.    Endoscopy post procedure time out was performed and specimens were verified with physician.    Endoscope was pre-cleaned at bedside immediately following procedure by paulette.

## 2024-10-30 NOTE — H&P
Pre-Endoscopy H&P Update  Chief complaint/HPI/ROS:  The indication for the procedure, the patient's history and the patient's current medications are reviewed prior to the procedure and that data is reported on the H&P to which this document is attached.  Any significant complaints with regard to organ systems will be noted, and if not mentioned then a review of systems is not contributory.  Past Medical History:   Diagnosis Date    Abdominal aortic aneurysm (AAA) (HCC) 9/23/2020    Anxiety     Asthmatic bronchitis 9/23/2020    Azotemia 9/23/2020    Benign paroxysmal positional vertigo 9/23/2020    Contact dermatitis 9/23/2020    Degenerative joint disease involving multiple joints 9/23/2020    Edema 9/23/2020    Essential hypertension 9/23/2020    Fibromyalgia 9/23/2020    Fibromyositis 9/23/2020    Hemodialysis patient ([facility])     Hyperlipidemia 9/23/2020    Idiopathic peripheral neuropathy 9/23/2020    Insomnia 9/23/2020    Irritable bowel syndrome 9/23/2020    Low back pain 9/23/2020    Reactive depression 9/23/2020    Thyroid disease     Tobacco dependence syndrome 9/23/2020    URI (upper respiratory infection) 9/23/2020      Past Surgical History:   Procedure Laterality Date    ABDOMINAL AORTIC ANEURYSM REPAIR      ? may have a weak leakage now (7/12/2024) - has appt in AUG 22, 2024 to evaluate    AV FISTULA PLACEMENT      not working    CHOLECYSTECTOMY  1996    HYSTERECTOMY (CERVIX STATUS UNKNOWN)  1995    IR TUNNELED CATHETER PLACEMENT GREATER THAN 5 YEARS  02/14/2023    IR TUNNELED CATHETER PLACEMENT GREATER THAN 5 YEARS 2/14/2023 [facility] ANGIO IR    IR TUNNELED CATHETER PLACEMENT GREATER THAN 5 YEARS  2/14/2023    ORTHOPEDIC SURGERY  1996    Right thumb surgery - d/t broken thumb    ORTHOPEDIC SURGERY Bilateral 2000    carpal tunnel release    OTHER SURGICAL HISTORY      stent placed in groin area to resolve an intestional blockage, but stent came out same day/had r leg internal bleeding and now uses a

## 2024-12-14 PROBLEM — M54.50 LOW BACK PAIN: Status: RESOLVED | Noted: 2020-09-23 | Resolved: 2024-12-14

## 2024-12-14 PROBLEM — J45.909 ASTHMATIC BRONCHITIS: Status: RESOLVED | Noted: 2020-09-23 | Resolved: 2024-12-14

## 2024-12-14 PROBLEM — R60.9 EDEMA: Status: RESOLVED | Noted: 2020-09-23 | Resolved: 2024-12-14

## 2024-12-14 PROBLEM — Z53.20 LUNG CANCER SCREENING DECLINED BY PATIENT: Status: ACTIVE | Noted: 2024-12-14

## 2024-12-17 ENCOUNTER — TELEPHONE (OUTPATIENT)
Facility: CLINIC | Age: 74
End: 2024-12-17

## 2024-12-17 SDOH — HEALTH STABILITY: PHYSICAL HEALTH: ON AVERAGE, HOW MANY MINUTES DO YOU ENGAGE IN EXERCISE AT THIS LEVEL?: 40 MIN

## 2024-12-17 SDOH — HEALTH STABILITY: PHYSICAL HEALTH: ON AVERAGE, HOW MANY DAYS PER WEEK DO YOU ENGAGE IN MODERATE TO STRENUOUS EXERCISE (LIKE A BRISK WALK)?: 4 DAYS

## 2024-12-17 ASSESSMENT — PATIENT HEALTH QUESTIONNAIRE - PHQ9
SUM OF ALL RESPONSES TO PHQ QUESTIONS 1-9: 0
2. FEELING DOWN, DEPRESSED OR HOPELESS: NOT AT ALL
SUM OF ALL RESPONSES TO PHQ QUESTIONS 1-9: 0
SUM OF ALL RESPONSES TO PHQ9 QUESTIONS 1 & 2: 0
SUM OF ALL RESPONSES TO PHQ QUESTIONS 1-9: 0
SUM OF ALL RESPONSES TO PHQ QUESTIONS 1-9: 0
1. LITTLE INTEREST OR PLEASURE IN DOING THINGS: NOT AT ALL

## 2024-12-17 ASSESSMENT — LIFESTYLE VARIABLES
HOW OFTEN DO YOU HAVE SIX OR MORE DRINKS ON ONE OCCASION: 1
HOW OFTEN DO YOU HAVE A DRINK CONTAINING ALCOHOL: NEVER
HOW OFTEN DO YOU HAVE A DRINK CONTAINING ALCOHOL: 1
HOW MANY STANDARD DRINKS CONTAINING ALCOHOL DO YOU HAVE ON A TYPICAL DAY: PATIENT DOES NOT DRINK
HOW MANY STANDARD DRINKS CONTAINING ALCOHOL DO YOU HAVE ON A TYPICAL DAY: 0

## 2024-12-19 ENCOUNTER — OFFICE VISIT (OUTPATIENT)
Facility: CLINIC | Age: 74
End: 2024-12-19

## 2024-12-19 VITALS
WEIGHT: 107.6 LBS | HEART RATE: 64 BPM | HEIGHT: 61 IN | BODY MASS INDEX: 20.32 KG/M2 | SYSTOLIC BLOOD PRESSURE: 138 MMHG | TEMPERATURE: 98 F | RESPIRATION RATE: 18 BRPM | DIASTOLIC BLOOD PRESSURE: 72 MMHG | OXYGEN SATURATION: 99 %

## 2024-12-19 DIAGNOSIS — F32.9 REACTIVE DEPRESSION: ICD-10-CM

## 2024-12-19 DIAGNOSIS — F17.218 CIGARETTE NICOTINE DEPENDENCE WITH OTHER NICOTINE-INDUCED DISORDER: ICD-10-CM

## 2024-12-19 DIAGNOSIS — Z53.20 LUNG CANCER SCREENING DECLINED BY PATIENT: ICD-10-CM

## 2024-12-19 DIAGNOSIS — Z99.2 ESRD ON DIALYSIS (HCC): ICD-10-CM

## 2024-12-19 DIAGNOSIS — Z00.00 MEDICARE ANNUAL WELLNESS VISIT, SUBSEQUENT: ICD-10-CM

## 2024-12-19 DIAGNOSIS — M15.0 PRIMARY OSTEOARTHRITIS INVOLVING MULTIPLE JOINTS: ICD-10-CM

## 2024-12-19 DIAGNOSIS — Z98.890 HISTORY OF AAA (ABDOMINAL AORTIC ANEURYSM) REPAIR: ICD-10-CM

## 2024-12-19 DIAGNOSIS — F41.9 ANXIETY: ICD-10-CM

## 2024-12-19 DIAGNOSIS — N25.81 HYPERPARATHYROIDISM, SECONDARY RENAL (HCC): ICD-10-CM

## 2024-12-19 DIAGNOSIS — E78.00 PURE HYPERCHOLESTEROLEMIA: ICD-10-CM

## 2024-12-19 DIAGNOSIS — N18.6 ESRD ON DIALYSIS (HCC): ICD-10-CM

## 2024-12-19 DIAGNOSIS — I71.40 ABDOMINAL AORTIC ANEURYSM (AAA) WITHOUT RUPTURE, UNSPECIFIED PART (HCC): ICD-10-CM

## 2024-12-19 DIAGNOSIS — Z79.899 POLYPHARMACY: ICD-10-CM

## 2024-12-19 DIAGNOSIS — I10 ESSENTIAL HYPERTENSION: ICD-10-CM

## 2024-12-19 DIAGNOSIS — G60.9 IDIOPATHIC PERIPHERAL NEUROPATHY: ICD-10-CM

## 2024-12-19 DIAGNOSIS — M79.7 FIBROMYALGIA: ICD-10-CM

## 2024-12-19 RX ORDER — CLONIDINE 0.2 MG/24H
PATCH, EXTENDED RELEASE TRANSDERMAL
Qty: 12 PATCH | Refills: 2 | Status: SHIPPED | OUTPATIENT
Start: 2024-12-19

## 2024-12-19 RX ORDER — PRAVASTATIN SODIUM 40 MG
40 TABLET ORAL NIGHTLY
Qty: 90 TABLET | Refills: 2 | Status: SHIPPED | OUTPATIENT
Start: 2024-12-19

## 2024-12-19 RX ORDER — NICOTINE 21 MG/24HR
1 PATCH, TRANSDERMAL 24 HOURS TRANSDERMAL DAILY
Qty: 42 PATCH | Refills: 0 | Status: SHIPPED | OUTPATIENT
Start: 2024-12-19 | End: 2025-01-30

## 2024-12-19 RX ORDER — NIFEDIPINE 60 MG/1
60 TABLET, EXTENDED RELEASE ORAL DAILY
Qty: 90 TABLET | Refills: 2 | Status: SHIPPED | OUTPATIENT
Start: 2024-12-19

## 2024-12-19 RX ORDER — ALBUTEROL SULFATE 90 UG/1
1 INHALANT RESPIRATORY (INHALATION) EVERY 6 HOURS PRN
Qty: 54 G | Refills: 1 | Status: SHIPPED | OUTPATIENT
Start: 2024-12-19

## 2024-12-19 ASSESSMENT — ENCOUNTER SYMPTOMS
ALLERGIC/IMMUNOLOGIC NEGATIVE: 1
RESPIRATORY NEGATIVE: 1
GASTROINTESTINAL NEGATIVE: 1

## 2024-12-19 ASSESSMENT — PATIENT HEALTH QUESTIONNAIRE - PHQ9
SUM OF ALL RESPONSES TO PHQ QUESTIONS 1-9: 0
SUM OF ALL RESPONSES TO PHQ QUESTIONS 1-9: 0
1. LITTLE INTEREST OR PLEASURE IN DOING THINGS: NOT AT ALL
SUM OF ALL RESPONSES TO PHQ9 QUESTIONS 1 & 2: 0
2. FEELING DOWN, DEPRESSED OR HOPELESS: NOT AT ALL
SUM OF ALL RESPONSES TO PHQ QUESTIONS 1-9: 0
SUM OF ALL RESPONSES TO PHQ QUESTIONS 1-9: 0

## 2024-12-19 NOTE — PATIENT INSTRUCTIONS
of quitting for good. Quitting is one of the most important things you can do to protect your heart. It is never too late to quit. Try to avoid secondhand smoke too.     Stay at a weight that's healthy for you. Talk to your doctor if you need help losing weight.     Try to get 7 to 9 hours of sleep each night.     Limit alcohol to 2 drinks a day for men and 1 drink a day for women. Too much alcohol can cause health problems.     Manage other health problems such as diabetes, high blood pressure, and high cholesterol. If you think you may have a problem with alcohol or drug use, talk to your doctor.   Medicines    Take your medicines exactly as prescribed. Call your doctor if you think you are having a problem with your medicine.     If your doctor recommends aspirin, take the amount directed each day. Make sure you take aspirin and not another kind of pain reliever, such as acetaminophen (Tylenol).   When should you call for help?   Call 911 if you have symptoms of a heart attack. These may include:    Chest pain or pressure, or a strange feeling in the chest.     Sweating.     Shortness of breath.     Pain, pressure, or a strange feeling in the back, neck, jaw, or upper belly or in one or both shoulders or arms.     Lightheadedness or sudden weakness.     A fast or irregular heartbeat.   After you call 911, the  may tell you to chew 1 adult-strength or 2 to 4 low-dose aspirin. Wait for an ambulance. Do not try to drive yourself.  Watch closely for changes in your health, and be sure to contact your doctor if you have any problems.  Where can you learn more?  Go to https://[email or web address].net/patientEd and enter F075 to learn more about \"A Healthy Heart: Care Instructions.\"  Current as of: June 24, 2023  Content Version: 14.2  © 2024 [employer].   Care instructions adapted under license by [employer]. If you have questions about a medical condition or this instruction, always ask your healthcare

## 2024-12-19 NOTE — PROGRESS NOTES
Chief Complaint   Patient presents with    Medicare AWV     /70 (Site: Right Upper Arm, Position: Sitting)   Pulse 67   Temp 98 °F (36.7 °C) (Oral)   Resp 18   Ht 1.55 m (5' 1.02\")   Wt 48.8 kg (107 lb 9.6 oz)   SpO2 99%   BMI 20.31 kg/m²     \"Have you been to the ER, urgent care clinic since your last visit?  Hospitalized since your last visit?\"    NO    “Have you seen or consulted any other health care providers outside our system since your last visit?”    NO           
      CareTeam (Including outside providers/suppliers regularly involved in providing care):   Patient Care Team:  Shannon Veras MD as PCP - General  Shannon Veras MD as PCP - Empaneled Provider      Reviewed and updated this visit:  Tobacco  Allergies  Meds  Problems  Med Hx  Surg Hx  Soc Hx  Fam Hx

## 2024-12-20 PROBLEM — I71.40 ABDOMINAL AORTIC ANEURYSM (AAA) WITHOUT RUPTURE (HCC): Status: ACTIVE | Noted: 2024-12-20

## 2024-12-20 PROBLEM — Z00.00 MEDICARE ANNUAL WELLNESS VISIT, SUBSEQUENT: Status: ACTIVE | Noted: 2024-12-20

## 2025-01-19 PROBLEM — Z00.00 MEDICARE ANNUAL WELLNESS VISIT, SUBSEQUENT: Status: RESOLVED | Noted: 2024-12-20 | Resolved: 2025-01-19

## 2025-01-24 RX ORDER — ALBUTEROL SULFATE 90 UG/1
2 INHALANT RESPIRATORY (INHALATION) EVERY 4 HOURS PRN
Qty: 54 G | Refills: 5 | Status: SHIPPED | OUTPATIENT
Start: 2025-01-24

## 2025-03-20 RX ORDER — POLYETHYLENE GLYCOL 3350 17 G/17G
17 POWDER, FOR SOLUTION ORAL DAILY
Qty: 850 G | Refills: 2 | Status: SHIPPED | OUTPATIENT
Start: 2025-03-20 | End: 2025-09-16

## 2025-04-16 RX ORDER — CARVEDILOL 12.5 MG/1
12.5 TABLET ORAL 2 TIMES DAILY
Qty: 180 TABLET | Refills: 2 | Status: SHIPPED | OUTPATIENT
Start: 2025-04-16

## 2025-04-27 DIAGNOSIS — N18.6 ESRD ON DIALYSIS (HCC): ICD-10-CM

## 2025-04-27 DIAGNOSIS — F41.9 ANXIETY: ICD-10-CM

## 2025-04-27 DIAGNOSIS — Z99.2 ESRD ON DIALYSIS (HCC): ICD-10-CM

## 2025-04-28 RX ORDER — ALPRAZOLAM 0.25 MG
0.25 TABLET ORAL NIGHTLY PRN
Qty: 30 TABLET | Refills: 2 | Status: SHIPPED | OUTPATIENT
Start: 2025-04-28 | End: 2025-07-27

## 2025-05-29 ENCOUNTER — OFFICE VISIT (OUTPATIENT)
Facility: CLINIC | Age: 75
End: 2025-05-29
Payer: MEDICARE

## 2025-05-29 VITALS
HEART RATE: 60 BPM | TEMPERATURE: 98 F | WEIGHT: 110 LBS | OXYGEN SATURATION: 99 % | SYSTOLIC BLOOD PRESSURE: 150 MMHG | DIASTOLIC BLOOD PRESSURE: 82 MMHG | HEIGHT: 61 IN | BODY MASS INDEX: 20.77 KG/M2 | RESPIRATION RATE: 18 BRPM

## 2025-05-29 DIAGNOSIS — F41.9 ANXIETY: ICD-10-CM

## 2025-05-29 DIAGNOSIS — N25.81 HYPERPARATHYROIDISM, SECONDARY RENAL: ICD-10-CM

## 2025-05-29 DIAGNOSIS — K58.8 OTHER IRRITABLE BOWEL SYNDROME: ICD-10-CM

## 2025-05-29 DIAGNOSIS — Z53.20 LUNG CANCER SCREENING DECLINED BY PATIENT: ICD-10-CM

## 2025-05-29 DIAGNOSIS — K59.09 CHRONIC CONSTIPATION: ICD-10-CM

## 2025-05-29 DIAGNOSIS — Z98.890 HISTORY OF AAA (ABDOMINAL AORTIC ANEURYSM) REPAIR: ICD-10-CM

## 2025-05-29 DIAGNOSIS — F17.218 CIGARETTE NICOTINE DEPENDENCE WITH OTHER NICOTINE-INDUCED DISORDER: ICD-10-CM

## 2025-05-29 DIAGNOSIS — M15.0 PRIMARY OSTEOARTHRITIS INVOLVING MULTIPLE JOINTS: ICD-10-CM

## 2025-05-29 DIAGNOSIS — G60.9 IDIOPATHIC PERIPHERAL NEUROPATHY: ICD-10-CM

## 2025-05-29 DIAGNOSIS — N18.6 ESRD ON DIALYSIS (HCC): ICD-10-CM

## 2025-05-29 DIAGNOSIS — Z99.2 ESRD ON DIALYSIS (HCC): ICD-10-CM

## 2025-05-29 DIAGNOSIS — E78.00 PURE HYPERCHOLESTEROLEMIA: ICD-10-CM

## 2025-05-29 DIAGNOSIS — F51.04 PSYCHOPHYSIOLOGICAL INSOMNIA: ICD-10-CM

## 2025-05-29 DIAGNOSIS — I10 ESSENTIAL HYPERTENSION: ICD-10-CM

## 2025-05-29 PROCEDURE — 1160F RVW MEDS BY RX/DR IN RCRD: CPT | Performed by: INTERNAL MEDICINE

## 2025-05-29 PROCEDURE — 3017F COLORECTAL CA SCREEN DOC REV: CPT | Performed by: INTERNAL MEDICINE

## 2025-05-29 PROCEDURE — 3079F DIAST BP 80-89 MM HG: CPT | Performed by: INTERNAL MEDICINE

## 2025-05-29 PROCEDURE — G8400 PT W/DXA NO RESULTS DOC: HCPCS | Performed by: INTERNAL MEDICINE

## 2025-05-29 PROCEDURE — 99214 OFFICE O/P EST MOD 30 MIN: CPT | Performed by: INTERNAL MEDICINE

## 2025-05-29 PROCEDURE — G8427 DOCREV CUR MEDS BY ELIG CLIN: HCPCS | Performed by: INTERNAL MEDICINE

## 2025-05-29 PROCEDURE — 1123F ACP DISCUSS/DSCN MKR DOCD: CPT | Performed by: INTERNAL MEDICINE

## 2025-05-29 PROCEDURE — 1090F PRES/ABSN URINE INCON ASSESS: CPT | Performed by: INTERNAL MEDICINE

## 2025-05-29 PROCEDURE — G8420 CALC BMI NORM PARAMETERS: HCPCS | Performed by: INTERNAL MEDICINE

## 2025-05-29 PROCEDURE — 3077F SYST BP >= 140 MM HG: CPT | Performed by: INTERNAL MEDICINE

## 2025-05-29 PROCEDURE — 1159F MED LIST DOCD IN RCRD: CPT | Performed by: INTERNAL MEDICINE

## 2025-05-29 PROCEDURE — 1125F AMNT PAIN NOTED PAIN PRSNT: CPT | Performed by: INTERNAL MEDICINE

## 2025-05-29 PROCEDURE — 4004F PT TOBACCO SCREEN RCVD TLK: CPT | Performed by: INTERNAL MEDICINE

## 2025-05-29 RX ORDER — CLONAZEPAM 0.25 MG/1
0.25 TABLET, ORALLY DISINTEGRATING ORAL NIGHTLY PRN
Qty: 90 TABLET | Refills: 0 | Status: SHIPPED | OUTPATIENT
Start: 2025-05-29 | End: 2025-08-27

## 2025-05-29 RX ORDER — NICOTINE 21 MG/24HR
1 PATCH, TRANSDERMAL 24 HOURS TRANSDERMAL DAILY
Qty: 30 PATCH | Refills: 1 | Status: SHIPPED | OUTPATIENT
Start: 2025-05-29

## 2025-05-29 SDOH — ECONOMIC STABILITY: FOOD INSECURITY: WITHIN THE PAST 12 MONTHS, THE FOOD YOU BOUGHT JUST DIDN'T LAST AND YOU DIDN'T HAVE MONEY TO GET MORE.: NEVER TRUE

## 2025-05-29 SDOH — ECONOMIC STABILITY: FOOD INSECURITY: WITHIN THE PAST 12 MONTHS, YOU WORRIED THAT YOUR FOOD WOULD RUN OUT BEFORE YOU GOT MONEY TO BUY MORE.: NEVER TRUE

## 2025-05-29 ASSESSMENT — PATIENT HEALTH QUESTIONNAIRE - PHQ9
8. MOVING OR SPEAKING SO SLOWLY THAT OTHER PEOPLE COULD HAVE NOTICED. OR THE OPPOSITE, BEING SO FIGETY OR RESTLESS THAT YOU HAVE BEEN MOVING AROUND A LOT MORE THAN USUAL: NOT AT ALL
3. TROUBLE FALLING OR STAYING ASLEEP: NOT AT ALL
2. FEELING DOWN, DEPRESSED OR HOPELESS: SEVERAL DAYS
1. LITTLE INTEREST OR PLEASURE IN DOING THINGS: SEVERAL DAYS
10. IF YOU CHECKED OFF ANY PROBLEMS, HOW DIFFICULT HAVE THESE PROBLEMS MADE IT FOR YOU TO DO YOUR WORK, TAKE CARE OF THINGS AT HOME, OR GET ALONG WITH OTHER PEOPLE: SOMEWHAT DIFFICULT
7. TROUBLE CONCENTRATING ON THINGS, SUCH AS READING THE NEWSPAPER OR WATCHING TELEVISION: NOT AT ALL
5. POOR APPETITE OR OVEREATING: NOT AT ALL
SUM OF ALL RESPONSES TO PHQ QUESTIONS 1-9: 3
4. FEELING TIRED OR HAVING LITTLE ENERGY: SEVERAL DAYS
SUM OF ALL RESPONSES TO PHQ QUESTIONS 1-9: 3
6. FEELING BAD ABOUT YOURSELF - OR THAT YOU ARE A FAILURE OR HAVE LET YOURSELF OR YOUR FAMILY DOWN: NOT AT ALL
SUM OF ALL RESPONSES TO PHQ QUESTIONS 1-9: 3
SUM OF ALL RESPONSES TO PHQ QUESTIONS 1-9: 3
9. THOUGHTS THAT YOU WOULD BE BETTER OFF DEAD, OR OF HURTING YOURSELF: NOT AT ALL

## 2025-05-29 ASSESSMENT — ENCOUNTER SYMPTOMS
GASTROINTESTINAL NEGATIVE: 1
ALLERGIC/IMMUNOLOGIC NEGATIVE: 1
RESPIRATORY NEGATIVE: 1

## 2025-05-29 NOTE — PROGRESS NOTES
Chief Complaint   Patient presents with    Follow-up Chronic Condition       Pulse 61   Temp 98 °F (36.7 °C) (Oral)   Resp 18   Ht 1.55 m (5' 1.02\")   Wt 49.9 kg (110 lb)   SpO2 99%   BMI 20.77 kg/m²         \"Have you been to the ER, urgent care clinic since your last visit?  Hospitalized since your last visit?\"    NO    “Have you seen or consulted any other health care providers outside our system since your last visit?”    Yes Dr Saba Hopper

## 2025-05-29 NOTE — PROGRESS NOTES
Crystal Adler (: 1950) is a 74 y.o. female, Established patient patient, here for evaluation of the following chief complaint(s):  Follow-up Chronic Condition         ASSESSMENT/PLAN:  Below is the assessment and plan developed based on review of pertinent history, physical exam, labs, studies, and medications.      1. ESRD on dialysis (HCC)  -     clonazePAM (KLONOPIN) 0.25 MG disintegrating tablet; Take 1 tablet by mouth nightly as needed for Anxiety for up to 90 days. Max Daily Amount: 0.25 mg, Disp-90 tablet, R-0Normal  2. Essential hypertension  3. Idiopathic peripheral neuropathy  4. Pure hypercholesterolemia  5. Psychophysiological insomnia  6. Chronic constipation  7. Hyperparathyroidism, secondary renal  8. Primary osteoarthritis involving multiple joints  9. History of AAA (abdominal aortic aneurysm) repair  10. Cigarette nicotine dependence with other nicotine-induced disorder  11. Lung cancer screening declined by patient  12. Other irritable bowel syndrome  13. Anxiety  -     clonazePAM (KLONOPIN) 0.25 MG disintegrating tablet; Take 1 tablet by mouth nightly as needed for Anxiety for up to 90 days. Max Daily Amount: 0.25 mg, Disp-90 tablet, R-0Normal    Hypertension slightly running on upper side of normal range however she has just received dialysis so she is going to take all medicines when she go to home, continue current dose of nifedipine ER and carvedilol.  And clonidine patch.      ESRD getting hemodialysis 3 days a week and having permacath in left chest wall and she has AV graft in left forearm that is closed.  She takes dialysis on  and getting Cinacalcet and calcitriol and also takes torsemide and she thinks that torsemide caused swelling of her face in the morning when she gets up.    Hypercholesteremia getting pravastatin also having peripheral vascular disease and abdominal aortic aneurysm.  And she is trying to quit smoking and she needs nicotine

## 2025-06-19 ENCOUNTER — TELEPHONE (OUTPATIENT)
Facility: CLINIC | Age: 75
End: 2025-06-19

## 2025-06-23 DIAGNOSIS — R05.9 COUGH, UNSPECIFIED TYPE: Primary | ICD-10-CM

## 2025-06-23 RX ORDER — AZITHROMYCIN 250 MG/1
TABLET, FILM COATED ORAL
Qty: 6 TABLET | Refills: 0 | Status: SHIPPED | OUTPATIENT
Start: 2025-06-23 | End: 2025-07-03

## 2025-06-25 ENCOUNTER — TELEPHONE (OUTPATIENT)
Facility: CLINIC | Age: 75
End: 2025-06-25

## 2025-08-21 ENCOUNTER — TELEPHONE (OUTPATIENT)
Facility: CLINIC | Age: 75
End: 2025-08-21

## 2025-08-21 RX ORDER — CLONIDINE 0.2 MG/24H
1 PATCH, EXTENDED RELEASE TRANSDERMAL WEEKLY
Qty: 12 PATCH | Refills: 2 | Status: SHIPPED | OUTPATIENT
Start: 2025-08-21

## 2025-08-21 RX ORDER — NIFEDIPINE 60 MG/1
60 TABLET, EXTENDED RELEASE ORAL DAILY
Qty: 90 TABLET | Refills: 2 | Status: SHIPPED | OUTPATIENT
Start: 2025-08-21

## 2025-08-21 RX ORDER — ALBUTEROL SULFATE 90 UG/1
1 INHALANT RESPIRATORY (INHALATION) EVERY 6 HOURS PRN
Qty: 54 G | Refills: 1 | Status: SHIPPED | OUTPATIENT
Start: 2025-08-21

## (undated) DEVICE — SET ADMIN 16ML TBNG L100IN 2 Y INJ SITE IV PIGGY BK DISP (ORDER IN MULIPLES OF 48)

## (undated) DEVICE — ELECTRODE,RADIOTRANSLUCENT,FOAM,3PK: Brand: MEDLINE

## (undated) DEVICE — BLUNTFILL: Brand: MONOJECT

## (undated) DEVICE — CANNULA CUSH AD W/ 14FT TBG

## (undated) DEVICE — KIT COLON W/ 1.1OZ LUB AND 2 END

## (undated) DEVICE — SOLIDIFIER FLD 2OZ 1500CC N DISINF IN BTL DISP SAFESORB

## (undated) DEVICE — SYRINGE MEDICAL 3ML CLEAR PLASTIC STANDARD NON CONTROL LUERLOCK TIP DISPOSABLE

## (undated) DEVICE — BLUNTFILL WITH FILTER: Brand: MONOJECT

## (undated) DEVICE — BITEBLOCK ENDOSCP 60FR MAXI WHT POLYETH STURDY W/ VELC WVN

## (undated) DEVICE — 1200 GUARD II KIT W/5MM TUBE W/O VAC TUBE: Brand: GUARDIAN

## (undated) DEVICE — CATHETER IV 22GA L1IN OD0.8382-0.9144MM ID0.6096-0.6858MM 382523

## (undated) DEVICE — IV STRT KT 3282] LSL INDUSTRIES INC]

## (undated) DEVICE — SYRINGE MED 5ML STD CLR PLAS LUERLOCK TIP N CTRL DISP